# Patient Record
Sex: MALE | Race: WHITE | NOT HISPANIC OR LATINO | Employment: FULL TIME | URBAN - METROPOLITAN AREA
[De-identification: names, ages, dates, MRNs, and addresses within clinical notes are randomized per-mention and may not be internally consistent; named-entity substitution may affect disease eponyms.]

---

## 2017-01-16 RX ORDER — TRAZODONE HYDROCHLORIDE 100 MG/1
TABLET ORAL
Qty: 90 TABLET | Refills: 0 | Status: SHIPPED | OUTPATIENT
Start: 2017-01-16 | End: 2017-04-11 | Stop reason: SDUPTHER

## 2017-03-14 ENCOUNTER — OFFICE VISIT (OUTPATIENT)
Dept: INTERNAL MEDICINE | Facility: CLINIC | Age: 42
End: 2017-03-14

## 2017-03-14 VITALS
WEIGHT: 176 LBS | OXYGEN SATURATION: 98 % | HEART RATE: 89 BPM | SYSTOLIC BLOOD PRESSURE: 126 MMHG | BODY MASS INDEX: 24.64 KG/M2 | HEIGHT: 71 IN | DIASTOLIC BLOOD PRESSURE: 70 MMHG

## 2017-03-14 DIAGNOSIS — G43.911 INTRACTABLE MIGRAINE WITH STATUS MIGRAINOSUS, UNSPECIFIED MIGRAINE TYPE: ICD-10-CM

## 2017-03-14 DIAGNOSIS — C44.91 SKIN CANCER, BASAL CELL: ICD-10-CM

## 2017-03-14 DIAGNOSIS — J01.11 ACUTE RECURRENT FRONTAL SINUSITIS: Primary | ICD-10-CM

## 2017-03-14 PROCEDURE — 99214 OFFICE O/P EST MOD 30 MIN: CPT | Performed by: INTERNAL MEDICINE

## 2017-03-14 RX ORDER — TOPIRAMATE 25 MG/1
TABLET ORAL
Qty: 60 TABLET | Refills: 3 | Status: SHIPPED | OUTPATIENT
Start: 2017-03-14 | End: 2017-07-12 | Stop reason: DRUGHIGH

## 2017-03-14 RX ORDER — AMOXICILLIN AND CLAVULANATE POTASSIUM 875; 125 MG/1; MG/1
1 TABLET, FILM COATED ORAL 2 TIMES DAILY
Qty: 20 TABLET | Refills: 0 | Status: SHIPPED | OUTPATIENT
Start: 2017-03-14 | End: 2017-05-08

## 2017-03-14 NOTE — PROGRESS NOTES
Sinusitis; nasal drainage; requesting referral (dermatology; neurology); and Headache    Subjective   Carson Butler is a 41 y.o. male is here today for follow-up.    History of Present Illness   Had sinus infection x 2 mos., was told xyzal is OTC.  Also having a neck lesion,and would like to see derm.  Having chronic HA, wakes up with them, takes a lot of excedrin migraine. Daily HA.        Current Outpatient Prescriptions:   •  amoxicillin-clavulanate (AUGMENTIN) 875-125 MG per tablet, Take 1 tablet by mouth 2 (Two) Times a Day., Disp: 20 tablet, Rfl: 0  •  Chlorcyclizine-Pseudoephed 25-60 MG tablet, Take 1 tablet by mouth 2 (Two) Times a Day., Disp: 60 tablet, Rfl: 2  •  Cholecalciferol (VITAMIN D) 2000 UNITS capsule, Take  by mouth., Disp: , Rfl:   •  levocetirizine (XYZAL) 5 MG tablet, Take 5 mg by mouth every evening., Disp: , Rfl:   •  minocycline (MINOCIN,DYNACIN) 50 MG capsule, Take 1 capsule by mouth 2 (Two) Times a Day., Disp: 60 capsule, Rfl: 2  •  Multiple Vitamins-Minerals (MULTI FOR HIM PO), Take 1 capsule by mouth daily., Disp: , Rfl:   •  topiramate (TOPAMAX) 25 MG tablet, Take 1 PO QHS x 3 days then 2 PO QHS, Disp: 60 tablet, Rfl: 3  •  traZODone (DESYREL) 100 MG tablet, TAKE 1 TABLET BY MOUTH EVERY NIGHT, Disp: 90 tablet, Rfl: 0      The following portions of the patient's history were reviewed and updated as appropriate: allergies, current medications, past family history, past medical history, past social history, past surgical history and problem list.    Review of Systems   Constitutional: Positive for fatigue. Negative for chills and fever.   HENT: Positive for congestion, sinus pressure and voice change. Negative for ear discharge, ear pain and sore throat.    Respiratory: Negative for cough, chest tightness and shortness of breath.    Cardiovascular: Negative for chest pain, palpitations and leg swelling.   Gastrointestinal: Negative for diarrhea, nausea and vomiting.   Musculoskeletal:  "Negative for arthralgias, back pain and myalgias.   Skin: Positive for rash.   Neurological: Positive for dizziness and headaches. Negative for syncope.   Psychiatric/Behavioral: Negative for confusion and sleep disturbance.       Objective   Visit Vitals   • /70   • Pulse 89   • Ht 71\" (180.3 cm)   • Wt 176 lb (79.8 kg)   • SpO2 98%   • BMI 24.55 kg/m2     Physical Exam   Constitutional: He is oriented to person, place, and time. He appears well-developed and well-nourished.   HENT:   Head: Normocephalic and atraumatic.   Right Ear: Tympanic membrane is bulging.   Left Ear: Tympanic membrane is bulging.   Mouth/Throat: Posterior oropharyngeal erythema present. No oropharyngeal exudate or tonsillar abscesses.   Eyes: Pupils are equal, round, and reactive to light.   Neck: Normal range of motion.   Pulmonary/Chest: Effort normal and breath sounds normal. No stridor.   Abdominal: Soft. Bowel sounds are normal.   Lymphadenopathy:     He has cervical adenopathy.   Neurological: He is alert and oriented to person, place, and time.   Skin: Skin is warm and dry. Rash (2 cm pale pink lesion over left neck) noted.   Psychiatric: He has a normal mood and affect.         Results for orders placed or performed in visit on 05/06/16   POCT Urinalysis Dipstick, Automated   Result Value Ref Range    Color Yellow Yellow, Straw, Dark Yellow, Julia    Clarity, UA Clear Clear    Glucose, UA Negative Negative mg/dL    Bilirubin Negative Negative    Ketones, UA Negative Negative    Specific Gravity  1.005 1.005 - 1.030    Blood, UA Negative Negative    pH, Urine 7.0 5.0 - 8.0    Protein, POC Negative Negative mg/dL    Urobilinogen, UA Normal Normal    Leukocytes Negative Negative    Nitrite, UA Negative Negative             Assessment/Plan   Diagnoses and all orders for this visit:    Acute recurrent frontal sinusitis  -     Chlorcyclizine-Pseudoephed 25-60 MG tablet; Take 1 tablet by mouth 2 (Two) Times a Day.  -     " amoxicillin-clavulanate (AUGMENTIN) 875-125 MG per tablet; Take 1 tablet by mouth 2 (Two) Times a Day.    Intractable migraine with status migrainosus, unspecified migraine type  -     Ambulatory Referral to Neurology  -     topiramate (TOPAMAX) 25 MG tablet; Take 1 PO QHS x 3 days then 2 PO QHS    Skin cancer, basal cell  -     Ambulatory Referral to Dermatology               Return for Next scheduled follow up.

## 2017-03-16 RX ORDER — PSEUDOEPHEDRINE HCL 120 MG/1
120 TABLET, FILM COATED, EXTENDED RELEASE ORAL EVERY 12 HOURS
Qty: 60 TABLET | Refills: 1 | Status: SHIPPED | OUTPATIENT
Start: 2017-03-16 | End: 2017-08-21

## 2017-04-11 RX ORDER — TRAZODONE HYDROCHLORIDE 100 MG/1
TABLET ORAL
Qty: 90 TABLET | Refills: 0 | Status: SHIPPED | OUTPATIENT
Start: 2017-04-11 | End: 2017-07-05 | Stop reason: SDUPTHER

## 2017-04-25 RX ORDER — MINOCYCLINE HYDROCHLORIDE 50 MG/1
CAPSULE ORAL
Qty: 60 CAPSULE | Refills: 5 | Status: SHIPPED | OUTPATIENT
Start: 2017-04-25 | End: 2017-10-31 | Stop reason: SDUPTHER

## 2017-04-28 ENCOUNTER — OFFICE VISIT (OUTPATIENT)
Dept: NEUROLOGY | Facility: CLINIC | Age: 42
End: 2017-04-28

## 2017-04-28 VITALS
DIASTOLIC BLOOD PRESSURE: 80 MMHG | WEIGHT: 173 LBS | HEIGHT: 71 IN | SYSTOLIC BLOOD PRESSURE: 128 MMHG | BODY MASS INDEX: 24.22 KG/M2

## 2017-04-28 DIAGNOSIS — G43.719 INTRACTABLE CHRONIC MIGRAINE WITHOUT AURA AND WITHOUT STATUS MIGRAINOSUS: Primary | ICD-10-CM

## 2017-04-28 PROCEDURE — 99243 OFF/OP CNSLTJ NEW/EST LOW 30: CPT | Performed by: PSYCHIATRY & NEUROLOGY

## 2017-04-28 RX ORDER — TOBRAMYCIN 3 MG/ML
SOLUTION/ DROPS OPHTHALMIC
Refills: 0 | COMMUNITY
Start: 2017-01-26 | End: 2017-05-08

## 2017-04-28 NOTE — PROGRESS NOTES
"Subjective   Carson Butler is a 41 y.o. male.     History of Present Illness     The following portions of the patient's history were reviewed today and updated as appropriate:  allergies, current medications, past family history, past medical history, past social history, past surgical history and problem list.      Chief complaint is headache and the patient is seen today in consultation at the request of the referring health care provider  The time I spent with the patient today was used discussing the differential diagnosis, treatment and management options and prognosis. I addressed all of the patient's questions.  She has a long childhood history of migraine type headaches that are all over and occur about 4-5 times a week. However since she has started Topamax on 50 mg they are now once to twice a week. He has been overusing Excedrin but now is uses it about once a week. He also has some response even though not as strong with Tylenol    Review of Systems   Constitutional: Negative for appetite change, chills and fatigue.   HENT: Negative for congestion, ear pain, facial swelling and sinus pressure.    Eyes: Negative for pain and redness.   Respiratory: Negative for shortness of breath.    Cardiovascular: Negative for chest pain.   Gastrointestinal: Negative for abdominal pain.   Endocrine: Negative for cold intolerance and heat intolerance.   Genitourinary: Negative for dysuria.   Musculoskeletal: Negative for arthralgias.   Skin: Negative for rash.   Allergic/Immunologic: Negative for immunocompromised state.   Neurological: Positive for headaches.   Hematological: Negative for adenopathy.   Psychiatric/Behavioral: Negative for hallucinations.         Objective      height is 71\" (180.3 cm) and weight is 173 lb (78.5 kg). His blood pressure is 128/80.     The patient's general appearance was normal today  Carotid pulses were palpable bilaterally  The ophthalmological exam showed the refractory media " clear, no blurring of disc margins, there were no hemorrhages noted, blood vessels appeared normal.      Neurologic Exam     Mental Status   Oriented to person.   Oriented to place. Oriented to country, city, area and street.   Oriented to time. Oriented to year, month, date, day and season.   Registration: recalls 3 of 3 objects. Recall at 5 minutes: recalls 3 of 3 objects. Follows 3 step commands.   Attention: normal.   Speech: speech is normal   Level of consciousness: alert  Knowledge: consistent with education.   Able to name object. Able to read. Able to repeat. Able to write. Normal comprehension.     Cranial Nerves     CN II   Visual fields full to confrontation.     CN III, IV, VI   Right pupil: Shape: regular. Consensual response: intact. Accommodation: intact.   Left pupil: Shape: regular. Consensual response: intact.   CN III: no CN III palsy  CN VI: no CN VI palsy  Nystagmus: none   Diplopia: none  Ophthalmoparesis: none  Upgaze: normal  Downgaze: normal  Conjugate gaze: present  Vestibulo-ocular reflex: present    CN V   Facial sensation intact.     CN VII   Facial expression full, symmetric.     CN VIII   CN VIII normal.     CN IX, X   CN IX normal.     CN XI   CN XI normal.     CN XII   CN XII normal.     Motor Exam   Muscle bulk: normal  Overall muscle tone: normal  Right arm pronator drift: absent  Left arm pronator drift: absent    Strength   Strength 5/5 except as noted.     Sensory Exam   Light touch normal.     Gait, Coordination, and Reflexes     Gait  Gait: normal    Coordination   Romberg: negative  Finger to nose coordination: normal  Heel to shin coordination: normal  Tandem walking coordination: normal    Tremor   Resting tremor: absent  Intention tremor: absent  Action tremor: absent    Reflexes   Reflexes 2+ except as noted.       Assessment/Plan     Carson was seen today for migraine and headache.    Diagnoses and all orders for this visit:    Intractable chronic migraine without aura  "and without status migrainosus        Discussion/Summary:    I explained the concept of analgesic overuse headaches to the patient\" chronic daily headaches triggering more headaches. At this point he is to increase the dose of Topamax to 75 mg. He had passing Coordination problems and issues with memory but hopefully they will not recur. If he keeps having a headache during the week he is to further increase the dose to 100 mg up to a maximum dose of 200 mg for now. He is to use over-the-counter medications for headaches not more frequently than once a week and if he has any problems is to contact the office that he can be reevaluated I will see him back as needed.                Disclaimer: This letter was created using dragon voice recognition software.  This voice recognition software may create errors that may go undetected and can impact the meaning of a sentence or paragraph.  The most frequent error is that the software misidentifies \"he\" and \"she\". However, contextual reading is often able to identify this as a voice recognotion error.  Another frequent error is that the software misidentifies \"the patient\" as \"\"          "

## 2017-05-02 ENCOUNTER — LAB (OUTPATIENT)
Dept: INTERNAL MEDICINE | Facility: CLINIC | Age: 42
End: 2017-05-02

## 2017-05-02 DIAGNOSIS — E78.5 DYSLIPIDEMIA: ICD-10-CM

## 2017-05-02 DIAGNOSIS — Z00.00 ANNUAL PHYSICAL EXAM: ICD-10-CM

## 2017-05-02 DIAGNOSIS — E55.9 VITAMIN D DEFICIENCY: Primary | ICD-10-CM

## 2017-05-02 LAB
25(OH)D3 SERPL-MCNC: 46.4 NG/ML
ALBUMIN SERPL-MCNC: 4.2 G/DL (ref 3.2–4.8)
ALBUMIN/GLOB SERPL: 1.4 G/DL (ref 1.5–2.5)
ALP SERPL-CCNC: 52 U/L (ref 25–100)
ALT SERPL W P-5'-P-CCNC: 31 U/L (ref 7–40)
ANION GAP SERPL CALCULATED.3IONS-SCNC: 7 MMOL/L (ref 3–11)
ARTICHOKE IGE QN: 156 MG/DL (ref 0–130)
AST SERPL-CCNC: 21 U/L (ref 0–33)
BASOPHILS # BLD AUTO: 0.07 10*3/MM3 (ref 0–0.2)
BASOPHILS NFR BLD AUTO: 1 % (ref 0–1)
BILIRUB SERPL-MCNC: 0.5 MG/DL (ref 0.3–1.2)
BUN BLD-MCNC: 15 MG/DL (ref 9–23)
BUN/CREAT SERPL: 16.7 (ref 7–25)
CALCIUM SPEC-SCNC: 10.2 MG/DL (ref 8.7–10.4)
CHLORIDE SERPL-SCNC: 108 MMOL/L (ref 99–109)
CHOLEST SERPL-MCNC: 219 MG/DL (ref 0–200)
CO2 SERPL-SCNC: 29 MMOL/L (ref 20–31)
CREAT BLD-MCNC: 0.9 MG/DL (ref 0.6–1.3)
DEPRECATED RDW RBC AUTO: 46 FL (ref 37–54)
EOSINOPHIL # BLD AUTO: 0.35 10*3/MM3 (ref 0.1–0.3)
EOSINOPHIL NFR BLD AUTO: 4.8 % (ref 0–3)
ERYTHROCYTE [DISTWIDTH] IN BLOOD BY AUTOMATED COUNT: 14.1 % (ref 11.3–14.5)
GFR SERPL CREATININE-BSD FRML MDRD: 92 ML/MIN/1.73
GLOBULIN UR ELPH-MCNC: 2.9 GM/DL
GLUCOSE BLD-MCNC: 108 MG/DL (ref 70–100)
HCT VFR BLD AUTO: 42.1 % (ref 38.9–50.9)
HDLC SERPL-MCNC: 45 MG/DL (ref 40–60)
HGB BLD-MCNC: 13.7 G/DL (ref 13.1–17.5)
IMM GRANULOCYTES # BLD: 0.02 10*3/MM3 (ref 0–0.03)
IMM GRANULOCYTES NFR BLD: 0.3 % (ref 0–0.6)
LYMPHOCYTES # BLD AUTO: 1.86 10*3/MM3 (ref 0.6–4.8)
LYMPHOCYTES NFR BLD AUTO: 25.5 % (ref 24–44)
MCH RBC QN AUTO: 29.4 PG (ref 27–31)
MCHC RBC AUTO-ENTMCNC: 32.5 G/DL (ref 32–36)
MCV RBC AUTO: 90.3 FL (ref 80–99)
MONOCYTES # BLD AUTO: 0.72 10*3/MM3 (ref 0–1)
MONOCYTES NFR BLD AUTO: 9.9 % (ref 0–12)
NEUTROPHILS # BLD AUTO: 4.26 10*3/MM3 (ref 1.5–8.3)
NEUTROPHILS NFR BLD AUTO: 58.5 % (ref 41–71)
PLATELET # BLD AUTO: 424 10*3/MM3 (ref 150–450)
PMV BLD AUTO: 9.6 FL (ref 6–12)
POTASSIUM BLD-SCNC: 4.3 MMOL/L (ref 3.5–5.5)
PROT SERPL-MCNC: 7.1 G/DL (ref 5.7–8.2)
RBC # BLD AUTO: 4.66 10*6/MM3 (ref 4.2–5.76)
SODIUM BLD-SCNC: 144 MMOL/L (ref 132–146)
TRIGL SERPL-MCNC: 146 MG/DL (ref 0–150)
TSH SERPL DL<=0.05 MIU/L-ACNC: 0.95 MIU/ML (ref 0.35–5.35)
WBC NRBC COR # BLD: 7.28 10*3/MM3 (ref 3.5–10.8)

## 2017-05-02 PROCEDURE — 80061 LIPID PANEL: CPT | Performed by: INTERNAL MEDICINE

## 2017-05-02 PROCEDURE — 84443 ASSAY THYROID STIM HORMONE: CPT | Performed by: INTERNAL MEDICINE

## 2017-05-02 PROCEDURE — 80053 COMPREHEN METABOLIC PANEL: CPT | Performed by: INTERNAL MEDICINE

## 2017-05-02 PROCEDURE — 85025 COMPLETE CBC W/AUTO DIFF WBC: CPT | Performed by: INTERNAL MEDICINE

## 2017-05-02 PROCEDURE — 82306 VITAMIN D 25 HYDROXY: CPT | Performed by: INTERNAL MEDICINE

## 2017-05-05 ENCOUNTER — LAB (OUTPATIENT)
Dept: INTERNAL MEDICINE | Facility: CLINIC | Age: 42
End: 2017-05-05

## 2017-05-05 DIAGNOSIS — Z00.00 HEALTH CARE MAINTENANCE: Primary | ICD-10-CM

## 2017-05-08 ENCOUNTER — OFFICE VISIT (OUTPATIENT)
Dept: INTERNAL MEDICINE | Facility: CLINIC | Age: 42
End: 2017-05-08

## 2017-05-08 VITALS
WEIGHT: 173.8 LBS | HEIGHT: 71 IN | HEART RATE: 97 BPM | SYSTOLIC BLOOD PRESSURE: 128 MMHG | OXYGEN SATURATION: 99 % | BODY MASS INDEX: 24.33 KG/M2 | DIASTOLIC BLOOD PRESSURE: 84 MMHG

## 2017-05-08 DIAGNOSIS — Z90.81 H/O SPLENECTOMY: ICD-10-CM

## 2017-05-08 DIAGNOSIS — E78.5 DYSLIPIDEMIA: ICD-10-CM

## 2017-05-08 DIAGNOSIS — J30.9 ALLERGIC RHINITIS, UNSPECIFIED ALLERGIC RHINITIS TRIGGER, UNSPECIFIED RHINITIS SEASONALITY: ICD-10-CM

## 2017-05-08 DIAGNOSIS — Z00.00 ANNUAL PHYSICAL EXAM: Primary | ICD-10-CM

## 2017-05-08 PROCEDURE — 90670 PCV13 VACCINE IM: CPT | Performed by: INTERNAL MEDICINE

## 2017-05-08 PROCEDURE — 90471 IMMUNIZATION ADMIN: CPT | Performed by: INTERNAL MEDICINE

## 2017-05-08 PROCEDURE — 99396 PREV VISIT EST AGE 40-64: CPT | Performed by: INTERNAL MEDICINE

## 2017-06-13 RX ORDER — TOPIRAMATE 100 MG/1
100 TABLET, FILM COATED ORAL 2 TIMES DAILY
Qty: 60 TABLET | Refills: 5 | Status: SHIPPED | OUTPATIENT
Start: 2017-06-13 | End: 2018-05-08 | Stop reason: SDUPTHER

## 2017-06-14 ENCOUNTER — TELEPHONE (OUTPATIENT)
Dept: NEUROLOGY | Facility: CLINIC | Age: 42
End: 2017-06-14

## 2017-06-14 NOTE — TELEPHONE ENCOUNTER
----- Message from Cristino Márquez MD sent at 6/14/2017  8:54 AM EDT -----  Contact: MACHO  Thanks!  ----- Message -----     From: Camilo Larson MA     Sent: 6/13/2017   4:47 PM       To: Cristino Márquez MD    I sent in new rx  ----- Message -----     From: Pat Santa     Sent: 6/13/2017   1:03 PM       To: [facility] [place]    JACK    PRESTON CALLED AND LEFT A MESSAGE. HE SAID JACK WANTED HIM TO CALL THE OFFICE ONCE HIS TOPIRAMATE WAS LEVELED OUT AND TO TELL HIM WHAT HE NEEDS NOW.    HE SAID HE NEEDS A 100 MG PRESCRIPTION  60 - WITH REFILLS - TO THE PHARMACY ON FILE    PLEASE CALL HIM BACK  [phone]

## 2017-07-05 RX ORDER — TRAZODONE HYDROCHLORIDE 100 MG/1
TABLET ORAL
Qty: 90 TABLET | Refills: 1 | Status: SHIPPED | OUTPATIENT
Start: 2017-07-05 | End: 2018-02-21 | Stop reason: SDUPTHER

## 2017-07-12 ENCOUNTER — OFFICE VISIT (OUTPATIENT)
Dept: INTERNAL MEDICINE | Facility: CLINIC | Age: 42
End: 2017-07-12

## 2017-07-12 VITALS
TEMPERATURE: 98.8 F | BODY MASS INDEX: 23.15 KG/M2 | DIASTOLIC BLOOD PRESSURE: 78 MMHG | OXYGEN SATURATION: 99 % | SYSTOLIC BLOOD PRESSURE: 120 MMHG | HEART RATE: 89 BPM | WEIGHT: 166 LBS

## 2017-07-12 DIAGNOSIS — J06.9 VIRAL UPPER RESPIRATORY TRACT INFECTION: Primary | ICD-10-CM

## 2017-07-12 PROCEDURE — 99213 OFFICE O/P EST LOW 20 MIN: CPT | Performed by: NURSE PRACTITIONER

## 2017-07-12 RX ORDER — ALBUTEROL SULFATE 90 UG/1
2 AEROSOL, METERED RESPIRATORY (INHALATION) EVERY 4 HOURS PRN
Qty: 18 G | Refills: 1 | Status: SHIPPED | OUTPATIENT
Start: 2017-07-12 | End: 2017-11-08

## 2017-07-12 RX ORDER — PSEUDOEPHEDRINE HYDROCHLORIDE 120 MG/1
TABLET, FILM COATED, EXTENDED RELEASE ORAL
Refills: 1 | COMMUNITY
Start: 2017-06-30 | End: 2017-08-21 | Stop reason: SDUPTHER

## 2017-07-12 NOTE — PROGRESS NOTES
Subjective  Cough (chest burns when coughing, sometimes productive, ongoing since Monday ) and Fever      Carson Butler is a 41 y.o. male.   Allergies   Allergen Reactions   • Other      History of Present Illness      Sx x 3 days , dry cough that took his breath and did have yellow brown sputum at that time, thinks he had a temp yesterday, is taking sudafed and otc sinus meds w/o relief , has had sore throat x 2 days   The following portions of the patient's history were reviewed and updated as appropriate: allergies, current medications, past family history, past medical history, past social history, past surgical history and problem list.    Review of Systems   Constitutional: Positive for fatigue and fever. Negative for activity change and unexpected weight change.   HENT: Positive for congestion, postnasal drip and sore throat. Negative for ear pain.    Eyes: Negative for pain and discharge.   Respiratory: Positive for cough. Negative for chest tightness, shortness of breath and wheezing.    Cardiovascular: Negative for chest pain and palpitations.   Gastrointestinal: Negative for abdominal pain, diarrhea and vomiting.   Endocrine: Negative.    Genitourinary: Negative.    Musculoskeletal: Negative for joint swelling.   Skin: Negative for color change, rash and wound.   Allergic/Immunologic: Negative.    Neurological: Negative for seizures and syncope.   Psychiatric/Behavioral: Negative.        Objective   Physical Exam   Constitutional: He is oriented to person, place, and time. He appears well-developed and well-nourished.  Non-toxic appearance. No distress.   HENT:   Head: Normocephalic and atraumatic. Hair is normal.   Right Ear: No drainage, swelling or tenderness. Tympanic membrane is retracted.   Left Ear: No drainage, swelling or tenderness. Tympanic membrane is retracted.   Nose: Mucosal edema present. No epistaxis.   Mouth/Throat: Uvula is midline and mucous membranes are normal. No oral lesions. No  uvula swelling. Posterior oropharyngeal erythema present. No oropharyngeal exudate.   lg pnd   Eyes: Conjunctivae and EOM are normal. Pupils are equal, round, and reactive to light. Right eye exhibits no discharge. Left eye exhibits no discharge. No scleral icterus.   Neck: Normal range of motion. Neck supple.   Cardiovascular: Normal rate and regular rhythm.  Exam reveals no gallop.    No murmur heard.  Pulmonary/Chest: Breath sounds normal. No stridor. No respiratory distress. He has no wheezes. He has no rales. He exhibits no tenderness.   Abdominal: Soft. There is no tenderness.   Lymphadenopathy:     He has cervical adenopathy.   Neurological: He is alert and oriented to person, place, and time. He exhibits normal muscle tone.   Skin: Skin is warm and dry. No rash noted. He is not diaphoretic.   Psychiatric: He has a normal mood and affect. His behavior is normal. Judgment and thought content normal.   Nursing note and vitals reviewed.    /78  Pulse 89  Temp 98.8 °F (37.1 °C)  Wt 166 lb (75.3 kg)  SpO2 99%  BMI 23.15 kg/m2    Assessment/Plan     Problem List Items Addressed This Visit     None      Visit Diagnoses     Viral upper respiratory tract infection    -  Primary    Relevant Medications    albuterol (PROVENTIL HFA;VENTOLIN HFA) 108 (90 BASE) MCG/ACT inhaler        Mucinex DM otc  Increase fluids. Tylenol/ibuprofen prn comfort, rtc 48h no improvement or worsening of sx.

## 2017-08-21 RX ORDER — PSEUDOEPHEDRINE HCL 120 MG/1
TABLET, FILM COATED, EXTENDED RELEASE ORAL
Qty: 60 TABLET | Refills: 3 | Status: SHIPPED | OUTPATIENT
Start: 2017-08-21 | End: 2017-11-01

## 2017-10-31 RX ORDER — MINOCYCLINE HYDROCHLORIDE 50 MG/1
CAPSULE ORAL
Qty: 60 CAPSULE | Refills: 0 | Status: SHIPPED | OUTPATIENT
Start: 2017-10-31 | End: 2018-04-17 | Stop reason: SDUPTHER

## 2017-11-01 ENCOUNTER — TELEPHONE (OUTPATIENT)
Dept: INTERNAL MEDICINE | Facility: CLINIC | Age: 42
End: 2017-11-01

## 2017-11-01 ENCOUNTER — LAB (OUTPATIENT)
Dept: INTERNAL MEDICINE | Facility: CLINIC | Age: 42
End: 2017-11-01

## 2017-11-01 DIAGNOSIS — E78.5 DYSLIPIDEMIA: ICD-10-CM

## 2017-11-01 DIAGNOSIS — Z00.00 ANNUAL PHYSICAL EXAM: ICD-10-CM

## 2017-11-01 LAB
ALBUMIN SERPL-MCNC: 4.1 G/DL (ref 3.2–4.8)
ALBUMIN/GLOB SERPL: 1.6 G/DL (ref 1.5–2.5)
ALP SERPL-CCNC: 50 U/L (ref 25–100)
ALT SERPL W P-5'-P-CCNC: 25 U/L (ref 7–40)
ANION GAP SERPL CALCULATED.3IONS-SCNC: 0 MMOL/L (ref 3–11)
ARTICHOKE IGE QN: 114 MG/DL (ref 0–130)
AST SERPL-CCNC: 20 U/L (ref 0–33)
BILIRUB SERPL-MCNC: 1.1 MG/DL (ref 0.3–1.2)
BUN BLD-MCNC: 20 MG/DL (ref 9–23)
BUN/CREAT SERPL: 22.2 (ref 7–25)
CALCIUM SPEC-SCNC: 9.1 MG/DL (ref 8.7–10.4)
CHLORIDE SERPL-SCNC: 106 MMOL/L (ref 99–109)
CHOLEST SERPL-MCNC: 160 MG/DL (ref 0–200)
CO2 SERPL-SCNC: 33 MMOL/L (ref 20–31)
CREAT BLD-MCNC: 0.9 MG/DL (ref 0.6–1.3)
GFR SERPL CREATININE-BSD FRML MDRD: 92 ML/MIN/1.73
GLOBULIN UR ELPH-MCNC: 2.5 GM/DL
GLUCOSE BLD-MCNC: 89 MG/DL (ref 70–100)
HDLC SERPL-MCNC: 44 MG/DL (ref 40–60)
POTASSIUM BLD-SCNC: 4.1 MMOL/L (ref 3.5–5.5)
PROT SERPL-MCNC: 6.6 G/DL (ref 5.7–8.2)
PSA SERPL-MCNC: 0.47 NG/ML (ref 0–4)
SODIUM BLD-SCNC: 139 MMOL/L (ref 132–146)
TRIGL SERPL-MCNC: 71 MG/DL (ref 0–150)

## 2017-11-01 PROCEDURE — 80053 COMPREHEN METABOLIC PANEL: CPT | Performed by: INTERNAL MEDICINE

## 2017-11-01 PROCEDURE — 84153 ASSAY OF PSA TOTAL: CPT | Performed by: INTERNAL MEDICINE

## 2017-11-01 PROCEDURE — 80061 LIPID PANEL: CPT | Performed by: INTERNAL MEDICINE

## 2017-11-01 NOTE — TELEPHONE ENCOUNTER
Pt requested that his pseudoephedrine be switched to 24 hour one tablet a day, instead of 12 hour twice a day. Please advise.

## 2017-11-08 ENCOUNTER — OFFICE VISIT (OUTPATIENT)
Dept: INTERNAL MEDICINE | Facility: CLINIC | Age: 42
End: 2017-11-08

## 2017-11-08 ENCOUNTER — TELEPHONE (OUTPATIENT)
Dept: INTERNAL MEDICINE | Facility: CLINIC | Age: 42
End: 2017-11-08

## 2017-11-08 VITALS
HEIGHT: 71 IN | HEART RATE: 88 BPM | BODY MASS INDEX: 22.79 KG/M2 | OXYGEN SATURATION: 99 % | WEIGHT: 162.8 LBS | SYSTOLIC BLOOD PRESSURE: 110 MMHG | DIASTOLIC BLOOD PRESSURE: 70 MMHG

## 2017-11-08 DIAGNOSIS — E04.1 THYROID NODULE: Primary | ICD-10-CM

## 2017-11-08 DIAGNOSIS — E78.5 DYSLIPIDEMIA: ICD-10-CM

## 2017-11-08 DIAGNOSIS — Z00.00 ANNUAL PHYSICAL EXAM: Primary | ICD-10-CM

## 2017-11-08 DIAGNOSIS — G43.011 INTRACTABLE MIGRAINE WITHOUT AURA AND WITH STATUS MIGRAINOSUS: ICD-10-CM

## 2017-11-08 PROCEDURE — 99214 OFFICE O/P EST MOD 30 MIN: CPT | Performed by: INTERNAL MEDICINE

## 2017-11-08 NOTE — PROGRESS NOTES
Follow-up (Thyroid Nodule); Dyslipidema; and Vitamin D Deficiency    Subjective   Carson Butler is a 41 y.o. male is here today for follow-up.    History of Present Illness   Having issues with sleep, getting 3 - 5 hrs due to working night shifts.  Has lost > 10 lbs, thinks due to above and from being on the topamax.  Headaches are coming back, and Dr. Dunham has moved, hence would like recommendations and referral for a neurologist.  Has been working oin his cholesterol, and had labs this visit.      Current Outpatient Prescriptions:   •  Cholecalciferol (VITAMIN D) 2000 UNITS capsule, Take  by mouth., Disp: , Rfl:   •  levocetirizine (XYZAL) 5 MG tablet, Take 5 mg by mouth every evening., Disp: , Rfl:   •  minocycline (MINOCIN,DYNACIN) 50 MG capsule, TAKE 1 CAPSULE BY MOUTH TWICE DAILY, Disp: 60 capsule, Rfl: 0  •  Multiple Vitamins-Minerals (MULTI FOR HIM PO), Take 1 capsule by mouth daily., Disp: , Rfl:   •  Pseudoephedrine HCl ER (SUDAFED 24 HOUR NON-DROWSY) 240 MG tablet sustained-release 24 hour, Take 1 tablet by mouth Daily., Disp: 30 each, Rfl: 5  •  topiramate (TOPAMAX) 100 MG tablet, Take 1 tablet by mouth 2 (Two) Times a Day., Disp: 60 tablet, Rfl: 5  •  traZODone (DESYREL) 100 MG tablet, TAKE 1 TABLET BY MOUTH EVERY NIGHT, Disp: 90 tablet, Rfl: 1      The following portions of the patient's history were reviewed and updated as appropriate: allergies, current medications, past family history, past medical history, past social history, past surgical history and problem list.    Review of Systems   Constitutional: Negative.  Negative for chills and fever.   HENT: Negative for ear discharge, ear pain, sinus pressure and sore throat.    Respiratory: Negative for cough, chest tightness and shortness of breath.    Cardiovascular: Negative for chest pain, palpitations and leg swelling.   Gastrointestinal: Negative for diarrhea, nausea and vomiting.   Musculoskeletal: Negative for arthralgias, back pain and  "myalgias.   Neurological: Negative for dizziness, syncope and headaches.   Psychiatric/Behavioral: Negative for confusion and sleep disturbance.       Objective   /70  Pulse 88  Ht 71\" (180.3 cm)  Wt 162 lb 12.8 oz (73.8 kg)  SpO2 99%  BMI 22.71 kg/m2  Physical Exam   Constitutional: He is oriented to person, place, and time. He appears well-developed and well-nourished.   HENT:   Head: Normocephalic and atraumatic.   Right Ear: External ear normal.   Left Ear: External ear normal.   Mouth/Throat: No oropharyngeal exudate.   Eyes: Conjunctivae are normal. Pupils are equal, round, and reactive to light.   Neck: Neck supple. No thyromegaly present.   Cardiovascular: Normal rate, regular rhythm and intact distal pulses.    Pulmonary/Chest: Effort normal and breath sounds normal.   Abdominal: Soft. Bowel sounds are normal. He exhibits no distension. There is no tenderness.   Musculoskeletal: He exhibits no edema.   Neurological: He is alert and oriented to person, place, and time. No cranial nerve deficit.   Skin: Skin is warm and dry.   Psychiatric: He has a normal mood and affect. Judgment normal.   Nursing note and vitals reviewed.        Results for orders placed or performed in visit on 11/01/17   Comprehensive Metabolic Panel   Result Value Ref Range    Glucose 89 70 - 100 mg/dL    BUN 20 9 - 23 mg/dL    Creatinine 0.90 0.60 - 1.30 mg/dL    Sodium 139 132 - 146 mmol/L    Potassium 4.1 3.5 - 5.5 mmol/L    Chloride 106 99 - 109 mmol/L    CO2 33.0 (H) 20.0 - 31.0 mmol/L    Calcium 9.1 8.7 - 10.4 mg/dL    Total Protein 6.6 5.7 - 8.2 g/dL    Albumin 4.10 3.20 - 4.80 g/dL    ALT (SGPT) 25 7 - 40 U/L    AST (SGOT) 20 0 - 33 U/L    Alkaline Phosphatase 50 25 - 100 U/L    Total Bilirubin 1.1 0.3 - 1.2 mg/dL    eGFR Non African Amer 92 >60 mL/min/1.73    Globulin 2.5 gm/dL    A/G Ratio 1.6 1.5 - 2.5 g/dL    BUN/Creatinine Ratio 22.2 7.0 - 25.0    Anion Gap 0.0 (L) 3.0 - 11.0 mmol/L   Lipid Panel   Result Value " Ref Range    Total Cholesterol 160 0 - 200 mg/dL    Triglycerides 71 0 - 150 mg/dL    HDL Cholesterol 44 40 - 60 mg/dL    LDL Cholesterol  114 0 - 130 mg/dL   PSA   Result Value Ref Range    PSA 0.470 0.000 - 4.000 ng/mL             Assessment/Plan   Diagnoses and all orders for this visit:    Thyroid nodule    Dyslipidemia  Comments:  much better with weight loss.    Intractable migraine without aura and with status migrainosus  Comments:  takes excedrin migraine prn.  Orders:  -     Ambulatory Referral to Neurology             Return in about 6 months (around 5/8/2018) for Annual physical.

## 2017-12-30 RX ORDER — TRAZODONE HYDROCHLORIDE 100 MG/1
TABLET ORAL
Qty: 90 TABLET | Refills: 0 | OUTPATIENT
Start: 2017-12-30

## 2018-02-14 ENCOUNTER — TELEPHONE (OUTPATIENT)
Dept: INTERNAL MEDICINE | Facility: CLINIC | Age: 43
End: 2018-02-14

## 2018-02-21 RX ORDER — TRAZODONE HYDROCHLORIDE 100 MG/1
TABLET ORAL
Qty: 90 TABLET | Refills: 0 | Status: SHIPPED | OUTPATIENT
Start: 2018-02-21 | End: 2018-05-08 | Stop reason: SDUPTHER

## 2018-02-28 ENCOUNTER — TELEPHONE (OUTPATIENT)
Dept: INTERNAL MEDICINE | Facility: CLINIC | Age: 43
End: 2018-02-28

## 2018-04-17 RX ORDER — MINOCYCLINE HYDROCHLORIDE 50 MG/1
CAPSULE ORAL
Qty: 60 CAPSULE | Refills: 0 | Status: SHIPPED | OUTPATIENT
Start: 2018-04-17 | End: 2018-05-08 | Stop reason: SDUPTHER

## 2018-05-01 ENCOUNTER — LAB (OUTPATIENT)
Dept: INTERNAL MEDICINE | Facility: CLINIC | Age: 43
End: 2018-05-01

## 2018-05-01 DIAGNOSIS — Z00.00 ANNUAL PHYSICAL EXAM: ICD-10-CM

## 2018-05-01 LAB
25(OH)D3 SERPL-MCNC: 44.9 NG/ML
ALBUMIN SERPL-MCNC: 4.1 G/DL (ref 3.2–4.8)
ALBUMIN/GLOB SERPL: 1.6 G/DL (ref 1.5–2.5)
ALP SERPL-CCNC: 52 U/L (ref 25–100)
ALT SERPL W P-5'-P-CCNC: 25 U/L (ref 7–40)
ANION GAP SERPL CALCULATED.3IONS-SCNC: 4 MMOL/L (ref 3–11)
ARTICHOKE IGE QN: 124 MG/DL (ref 0–130)
AST SERPL-CCNC: 19 U/L (ref 0–33)
BILIRUB SERPL-MCNC: 0.7 MG/DL (ref 0.3–1.2)
BUN BLD-MCNC: 22 MG/DL (ref 9–23)
BUN/CREAT SERPL: 22 (ref 7–25)
CALCIUM SPEC-SCNC: 9.3 MG/DL (ref 8.7–10.4)
CHLORIDE SERPL-SCNC: 108 MMOL/L (ref 99–109)
CHOLEST SERPL-MCNC: 183 MG/DL (ref 0–200)
CO2 SERPL-SCNC: 28 MMOL/L (ref 20–31)
CREAT BLD-MCNC: 1 MG/DL (ref 0.6–1.3)
DEPRECATED RDW RBC AUTO: 45.5 FL (ref 37–54)
ERYTHROCYTE [DISTWIDTH] IN BLOOD BY AUTOMATED COUNT: 13.9 % (ref 11.3–14.5)
GFR SERPL CREATININE-BSD FRML MDRD: 81 ML/MIN/1.73
GLOBULIN UR ELPH-MCNC: 2.5 GM/DL
GLUCOSE BLD-MCNC: 109 MG/DL (ref 70–100)
HBA1C MFR BLD: 6 % (ref 4.8–5.6)
HCT VFR BLD AUTO: 43.1 % (ref 38.9–50.9)
HDLC SERPL-MCNC: 43 MG/DL (ref 40–60)
HGB BLD-MCNC: 14.3 G/DL (ref 13.1–17.5)
MCH RBC QN AUTO: 29.5 PG (ref 27–31)
MCHC RBC AUTO-ENTMCNC: 33.2 G/DL (ref 32–36)
MCV RBC AUTO: 88.9 FL (ref 80–99)
PLATELET # BLD AUTO: 357 10*3/MM3 (ref 150–450)
PMV BLD AUTO: 9.8 FL (ref 6–12)
POTASSIUM BLD-SCNC: 4.4 MMOL/L (ref 3.5–5.5)
PROT SERPL-MCNC: 6.6 G/DL (ref 5.7–8.2)
RBC # BLD AUTO: 4.85 10*6/MM3 (ref 4.2–5.76)
SODIUM BLD-SCNC: 140 MMOL/L (ref 132–146)
TRIGL SERPL-MCNC: 145 MG/DL (ref 0–150)
TSH SERPL DL<=0.05 MIU/L-ACNC: 0.81 MIU/ML (ref 0.35–5.35)
WBC NRBC COR # BLD: 7.17 10*3/MM3 (ref 3.5–10.8)

## 2018-05-01 PROCEDURE — 82306 VITAMIN D 25 HYDROXY: CPT | Performed by: INTERNAL MEDICINE

## 2018-05-01 PROCEDURE — 80061 LIPID PANEL: CPT | Performed by: INTERNAL MEDICINE

## 2018-05-01 PROCEDURE — 83036 HEMOGLOBIN GLYCOSYLATED A1C: CPT | Performed by: INTERNAL MEDICINE

## 2018-05-01 PROCEDURE — 85027 COMPLETE CBC AUTOMATED: CPT | Performed by: INTERNAL MEDICINE

## 2018-05-01 PROCEDURE — 84443 ASSAY THYROID STIM HORMONE: CPT | Performed by: INTERNAL MEDICINE

## 2018-05-01 PROCEDURE — 80053 COMPREHEN METABOLIC PANEL: CPT | Performed by: INTERNAL MEDICINE

## 2018-05-08 ENCOUNTER — OFFICE VISIT (OUTPATIENT)
Dept: INTERNAL MEDICINE | Facility: CLINIC | Age: 43
End: 2018-05-08

## 2018-05-08 VITALS
OXYGEN SATURATION: 98 % | HEIGHT: 71 IN | DIASTOLIC BLOOD PRESSURE: 84 MMHG | WEIGHT: 166 LBS | BODY MASS INDEX: 23.24 KG/M2 | HEART RATE: 108 BPM | SYSTOLIC BLOOD PRESSURE: 130 MMHG | TEMPERATURE: 98.6 F

## 2018-05-08 DIAGNOSIS — J30.89 CHRONIC NONSEASONAL ALLERGIC RHINITIS DUE TO OTHER ALLERGEN: ICD-10-CM

## 2018-05-08 DIAGNOSIS — E04.1 THYROID NODULE: ICD-10-CM

## 2018-05-08 DIAGNOSIS — R73.01 IFG (IMPAIRED FASTING GLUCOSE): ICD-10-CM

## 2018-05-08 DIAGNOSIS — E78.5 DYSLIPIDEMIA: ICD-10-CM

## 2018-05-08 DIAGNOSIS — Z00.00 ANNUAL PHYSICAL EXAM: Primary | ICD-10-CM

## 2018-05-08 DIAGNOSIS — E55.9 VITAMIN D DEFICIENCY: ICD-10-CM

## 2018-05-08 DIAGNOSIS — G43.019 INTRACTABLE MIGRAINE WITHOUT AURA AND WITHOUT STATUS MIGRAINOSUS: ICD-10-CM

## 2018-05-08 DIAGNOSIS — Z90.81 H/O SPLENECTOMY: ICD-10-CM

## 2018-05-08 LAB
BILIRUB BLD-MCNC: NEGATIVE MG/DL
CLARITY, POC: CLEAR
COLOR UR: YELLOW
GLUCOSE UR STRIP-MCNC: NEGATIVE MG/DL
INDURATION: NORMAL MM (ref 0–10)
KETONES UR QL: NEGATIVE
LEUKOCYTE EST, POC: NEGATIVE
NITRITE UR-MCNC: NEGATIVE MG/ML
PH UR: 6.5 [PH] (ref 5–8)
PROT UR STRIP-MCNC: NEGATIVE MG/DL
RBC # UR STRIP: NEGATIVE /UL
SP GR UR: 1.01 (ref 1–1.03)
TB SKIN TEST: NORMAL
UROBILINOGEN UR QL: NORMAL

## 2018-05-08 PROCEDURE — 99396 PREV VISIT EST AGE 40-64: CPT | Performed by: INTERNAL MEDICINE

## 2018-05-08 PROCEDURE — 81003 URINALYSIS AUTO W/O SCOPE: CPT | Performed by: INTERNAL MEDICINE

## 2018-05-08 RX ORDER — MINOCYCLINE HYDROCHLORIDE 50 MG/1
50 CAPSULE ORAL DAILY
Qty: 90 CAPSULE | Refills: 1 | Status: SHIPPED | OUTPATIENT
Start: 2018-05-08 | End: 2019-01-08 | Stop reason: SDUPTHER

## 2018-05-08 RX ORDER — MINOCYCLINE HYDROCHLORIDE 50 MG/1
50 CAPSULE ORAL 2 TIMES DAILY
Qty: 60 CAPSULE | Refills: 5 | Status: CANCELLED | OUTPATIENT
Start: 2018-05-08

## 2018-05-08 RX ORDER — TOPIRAMATE 100 MG/1
100 TABLET, FILM COATED ORAL 2 TIMES DAILY
Qty: 60 TABLET | Refills: 5 | Status: SHIPPED | OUTPATIENT
Start: 2018-05-08 | End: 2018-11-04 | Stop reason: SDUPTHER

## 2018-05-08 RX ORDER — TRAZODONE HYDROCHLORIDE 100 MG/1
100 TABLET ORAL NIGHTLY
Qty: 90 TABLET | Refills: 1 | Status: SHIPPED | OUTPATIENT
Start: 2018-05-08 | End: 2019-01-08 | Stop reason: SDUPTHER

## 2018-05-08 NOTE — PROGRESS NOTES
Chief Complaint   Patient presents with   • Annual Exam       History of Present Illness  HM, Adult Male: The patient is being seen for a health maintenance evaluation. The last health maintenance visit was 1 year(s) ago.   Social History: Household members include spouse. He is , with 1 daughter, now a year old.  Work status: working full time as [employer], visits NH. The patient has never smoked cigarettes. He reports never drinking alcohol. He has never used illicit drugs.   General Health: The patient's health is described as good. He has regular dental visits. He denies vision problems. He denies hearing loss. Immunizations status: up to date.   Lifestyle: He consumes a diverse and healthy diet. He does not have any weight concerns. He exercises regularly. He does not use tobacco. He does report to occasional alcohol use. He denies drug use.   Screening: Cancer screening reviewed and current.   Metabolic screening reviewed and current.   Risk screening reviewed and current.   Sinuses still acting up, quit allergy shots, now on sudafed, and nasal rinse, and on meds    Review of Systems   Constitutional: Negative.  Negative for chills and fever.   HENT: Negative for ear discharge, ear pain, sinus pressure and sore throat.    Respiratory: Negative for cough, chest tightness and shortness of breath.    Cardiovascular: Negative for chest pain, palpitations and leg swelling.   Gastrointestinal: Negative for diarrhea, nausea and vomiting.   Musculoskeletal: Negative for arthralgias, back pain and myalgias.   Neurological: Negative for dizziness, syncope and headaches.   Psychiatric/Behavioral: Negative for confusion and sleep disturbance.       Patient Active Problem List   Diagnosis   • Dyslipidemia   • Thyroid nodule   • Vitamin D deficiency   • Left-sided low back pain with left-sided sciatica   • Non-healing skin lesion   • H/O splenectomy   • Intractable migraine without aura and with status  "migrainosus       Social History     Social History   • Marital status:      Spouse name: N/A   • Number of children: N/A   • Years of education: N/A     Occupational History   • Not on file.     Social History Main Topics   • Smoking status: Former Smoker     Types: Cigarettes     Quit date: 5/6/2009   • Smokeless tobacco: Former User      Comment: 11/05/2014 - never smoked tobacco    • Alcohol use No      Comment: beer on weekends   • Drug use: No   • Sexual activity: Defer     Other Topics Concern   • Not on file     Social History Narrative   • No narrative on file       Current Outpatient Prescriptions on File Prior to Visit   Medication Sig Dispense Refill   • Cholecalciferol (VITAMIN D) 2000 UNITS capsule Take  by mouth.     • levocetirizine (XYZAL) 5 MG tablet Take 5 mg by mouth every evening.     • Multiple Vitamins-Minerals (MULTI FOR HIM PO) Take 1 capsule by mouth daily.     • [DISCONTINUED] minocycline (MINOCIN,DYNACIN) 50 MG capsule TAKE 1 CAPSULE BY MOUTH TWICE DAILY 60 capsule 0   • [DISCONTINUED] Pseudoephedrine HCl ER (SUDAFED 24 HOUR NON-DROWSY) 240 MG tablet sustained-release 24 hour Take 1 tablet by mouth Daily. 30 each 5   • [DISCONTINUED] topiramate (TOPAMAX) 100 MG tablet Take 1 tablet by mouth 2 (Two) Times a Day. 60 tablet 5   • [DISCONTINUED] traZODone (DESYREL) 100 MG tablet TAKE 1 TABLET BY MOUTH EVERY NIGHT 90 tablet 0     No current facility-administered medications on file prior to visit.        Allergies   Allergen Reactions   • Other        /84   Pulse 108   Temp 98.6 °F (37 °C)   Ht 180.3 cm (71\")   Wt 75.3 kg (166 lb)   SpO2 98%   BMI 23.15 kg/m²          The following portions of the patient's history were reviewed and updated as appropriate: allergies, current medications, past family history, past medical history, past social history, past surgical history and problem list.    Physical Exam   Constitutional: He is oriented to person, place, and time. He " appears well-developed and well-nourished.   HENT:   Head: Normocephalic and atraumatic.   Right Ear: External ear normal. Tympanic membrane is bulging.   Left Ear: External ear normal. Tympanic membrane is bulging.   Mouth/Throat: Posterior oropharyngeal erythema present. No oropharyngeal exudate or tonsillar abscesses.   Eyes: Conjunctivae are normal. Pupils are equal, round, and reactive to light. No scleral icterus.   Neck: Normal range of motion. Neck supple. No thyromegaly present.   Cardiovascular: Normal rate, regular rhythm and intact distal pulses.    No murmur heard.  Pulmonary/Chest: Effort normal and breath sounds normal. No stridor. He has no wheezes. He has no rales.   Abdominal: Soft. Bowel sounds are normal. He exhibits no distension and no mass. There is no tenderness. There is no rebound.   Musculoskeletal: He exhibits no edema or tenderness.   Lymphadenopathy:     He has no cervical adenopathy.   Neurological: He is alert and oriented to person, place, and time. He displays normal reflexes. No cranial nerve deficit. Coordination normal.   Skin: Skin is warm and dry.   Psychiatric: He has a normal mood and affect. His behavior is normal. Judgment and thought content normal.   Nursing note and vitals reviewed.      Results for orders placed or performed in visit on 05/08/18   POCT urinalysis dipstick, automated   Result Value Ref Range    Color Yellow Yellow, Straw, Dark Yellow, Julia    Clarity, UA Clear Clear    Glucose, UA Negative Negative, 1000 mg/dL (3+) mg/dL    Bilirubin Negative Negative    Ketones, UA Negative Negative    Specific Gravity  1.015 1.005 - 1.030    Blood, UA Negative Negative    pH, Urine 6.5 5.0 - 8.0    Protein, POC Negative Negative mg/dL    Urobilinogen, UA Normal Normal    Leukocytes Negative Negative    Nitrite, UA Negative Negative   TB Skin Test   Result Value Ref Range    TB Skin Test      Induration  0 - 10 mm       Carson was seen today for annual  exam.    Diagnoses and all orders for this visit:    Annual physical exam  -     POCT urinalysis dipstick, automated  -     TB Skin Test  -     PSA Screen; Future    Vitamin D deficiency    Dyslipidemia    H/O splenectomy    IFG (impaired fasting glucose)  -     Hemoglobin A1c; Future    Thyroid nodule  -     TSH; Future  -     T4, Free; Future    Intractable migraine without aura and without status migrainosus  -     topiramate (TOPAMAX) 100 MG tablet; Take 1 tablet by mouth 2 (Two) Times a Day.    Chronic nonseasonal allergic rhinitis due to other allergen  -     Pseudoephedrine HCl ER (SUDAFED 24 HOUR NON-DROWSY) 240 MG tablet sustained-release 24 hour; Take 1 tablet by mouth Daily.  -     traZODone (DESYREL) 100 MG tablet; Take 1 tablet by mouth Every Night.    Other orders  -     Cancel: minocycline (MINOCIN,DYNACIN) 50 MG capsule; Take 1 capsule by mouth 2 (Two) Times a Day.  -     minocycline (MINOCIN,DYNACIN) 50 MG capsule; Take 1 capsule by mouth Daily.        Health Maintenance   Topic Date Due   • ANNUAL PHYSICAL  05/09/2018   • INFLUENZA VACCINE  08/01/2018   • TDAP/TD VACCINES (2 - Td) 04/26/2023       Discussion/Summary  Impression: health maintenance visit, healthy adult male.   Currently, he eats a healthy diet and has an adequate exercise regimen.   Prostate cancer screening: PSA was ordered for next visit.   Testicular cancer screening: monthly self testicular exam was advised.   Colorectal cancer screening: colonoscopy is indicated at 50.   Screening lab work includes glucose, lipid profile and 25-hydroxyvitamin D.   The immunizations are up to date.   Advice and education were given regarding cardiovascular risk reduction and self skin examination.   Patient discussion: discussed with the patient.     Return in about 6 months (around 11/8/2018) for Next scheduled follow up.

## 2018-05-09 PROCEDURE — 86580 TB INTRADERMAL TEST: CPT | Performed by: INTERNAL MEDICINE

## 2018-05-15 RX ORDER — MINOCYCLINE HYDROCHLORIDE 50 MG/1
CAPSULE ORAL
Qty: 60 CAPSULE | Refills: 0 | OUTPATIENT
Start: 2018-05-15

## 2018-05-19 RX ORDER — TRAZODONE HYDROCHLORIDE 100 MG/1
TABLET ORAL
Qty: 90 TABLET | Refills: 0 | Status: SHIPPED | OUTPATIENT
Start: 2018-05-19 | End: 2019-01-08

## 2018-06-12 ENCOUNTER — TELEPHONE (OUTPATIENT)
Dept: INTERNAL MEDICINE | Facility: CLINIC | Age: 43
End: 2018-06-12

## 2018-06-12 DIAGNOSIS — C44.91 SKIN CANCER, BASAL CELL: Primary | ICD-10-CM

## 2018-06-12 NOTE — TELEPHONE ENCOUNTER
PT WAS SEEN BY DR. MONDRAGON AND WAS REFERRED TO SEE A DERMATOLOGIST WHICH HAS . THE PT WAS NEEDING A NEW REFERRAL SENT TO FAX # [phone] WHICH WAS A REFERRAL TO DR. JEANCARLOS MONROE. BEST CALL BACK # FOR [phone]

## 2018-11-04 DIAGNOSIS — G43.019 INTRACTABLE MIGRAINE WITHOUT AURA AND WITHOUT STATUS MIGRAINOSUS: ICD-10-CM

## 2018-11-05 ENCOUNTER — TELEPHONE (OUTPATIENT)
Dept: INTERNAL MEDICINE | Facility: CLINIC | Age: 43
End: 2018-11-05

## 2018-11-05 RX ORDER — TOPIRAMATE 100 MG/1
TABLET, FILM COATED ORAL
Qty: 60 TABLET | Refills: 2 | Status: SHIPPED | OUTPATIENT
Start: 2018-11-05 | End: 2019-01-08

## 2018-11-05 RX ORDER — TRAZODONE HYDROCHLORIDE 100 MG/1
TABLET ORAL
Qty: 90 TABLET | Refills: 0 | Status: SHIPPED | OUTPATIENT
Start: 2018-11-05 | End: 2019-01-08

## 2018-11-05 NOTE — TELEPHONE ENCOUNTER
Called pt states he was seen in the ER on  11/01/18 was dx with kidney stone at [facility] and he has an appointment on  11/09/18 with urology. Pt just wanted to inform you. I know informed pt that if sx worsen or don't improve he need to seen or go to the ER.

## 2018-11-05 NOTE — TELEPHONE ENCOUNTER
PLEASE RETURN PT'S PHONE CALL; HE ONLY WANTED TO STATED THAT CALL IS CONCERNING A MEDICAL ISSUE [phone]

## 2018-12-27 ENCOUNTER — TELEPHONE (OUTPATIENT)
Dept: INTERNAL MEDICINE | Facility: CLINIC | Age: 43
End: 2018-12-27

## 2019-01-03 ENCOUNTER — TELEPHONE (OUTPATIENT)
Dept: INTERNAL MEDICINE | Facility: CLINIC | Age: 44
End: 2019-01-03

## 2019-01-03 NOTE — TELEPHONE ENCOUNTER
Please check if he can do 11.45 tomorrow or we can get him in Tuesday at 12.15- overbook.    Thank you

## 2019-01-03 NOTE — TELEPHONE ENCOUNTER
Spoke with patient he will do the Tuesday at 12:15 I will have Brooklynn or Jazlyn double book him for the appointment

## 2019-01-03 NOTE — TELEPHONE ENCOUNTER
PATIENT CALLED TO SCHEDULE AN APPT WITH DR. MONDRAGON FOR A SINUS INFECTION. HER NEXT AVAILABLE IS 1/16.. PATIENT DIDN'T WANT TO WAIT THAT LONG AND REFUSED TO SEE AN APRN OR PA. CAN WE FIT HIM IN THE SCHEDULE SOMETIME ON A Monday OR Tuesday AROUND NOON WORKS BEST FOR HIM. PLEASE ADVISE AND GIVE PT A CALL. THANKS.

## 2019-01-08 ENCOUNTER — OFFICE VISIT (OUTPATIENT)
Dept: INTERNAL MEDICINE | Facility: CLINIC | Age: 44
End: 2019-01-08

## 2019-01-08 VITALS
SYSTOLIC BLOOD PRESSURE: 120 MMHG | HEART RATE: 96 BPM | WEIGHT: 171 LBS | TEMPERATURE: 97.9 F | BODY MASS INDEX: 23.94 KG/M2 | HEIGHT: 71 IN | OXYGEN SATURATION: 98 % | DIASTOLIC BLOOD PRESSURE: 72 MMHG

## 2019-01-08 DIAGNOSIS — F51.01 PRIMARY INSOMNIA: ICD-10-CM

## 2019-01-08 DIAGNOSIS — G43.019 INTRACTABLE MIGRAINE WITHOUT AURA AND WITHOUT STATUS MIGRAINOSUS: ICD-10-CM

## 2019-01-08 DIAGNOSIS — J01.00 SUBACUTE MAXILLARY SINUSITIS: Primary | ICD-10-CM

## 2019-01-08 DIAGNOSIS — M54.2 CERVICALGIA: ICD-10-CM

## 2019-01-08 PROCEDURE — 99214 OFFICE O/P EST MOD 30 MIN: CPT | Performed by: INTERNAL MEDICINE

## 2019-01-08 RX ORDER — MINOCYCLINE HYDROCHLORIDE 50 MG/1
50 CAPSULE ORAL DAILY
Qty: 90 CAPSULE | Refills: 1 | Status: SHIPPED | OUTPATIENT
Start: 2019-01-08 | End: 2019-05-09 | Stop reason: SDUPTHER

## 2019-01-08 RX ORDER — METHYLPREDNISOLONE 4 MG/1
TABLET ORAL
Qty: 1 EACH | Refills: 0 | Status: SHIPPED | OUTPATIENT
Start: 2019-01-08 | End: 2019-03-01

## 2019-01-08 RX ORDER — TRAZODONE HYDROCHLORIDE 100 MG/1
100 TABLET ORAL NIGHTLY
Qty: 90 TABLET | Refills: 1 | Status: SHIPPED | OUTPATIENT
Start: 2019-01-08 | End: 2019-05-09 | Stop reason: SDUPTHER

## 2019-01-08 RX ORDER — SUMATRIPTAN 100 MG/1
100 TABLET, FILM COATED ORAL
Qty: 9 TABLET | Refills: 3 | Status: SHIPPED | OUTPATIENT
Start: 2019-01-08 | End: 2019-03-05

## 2019-01-08 RX ORDER — PSEUDOEPHEDRINE HYDROCHLORIDE 120 MG/1
TABLET, FILM COATED, EXTENDED RELEASE ORAL
Refills: 0 | COMMUNITY
Start: 2018-11-15 | End: 2019-01-08

## 2019-01-08 RX ORDER — AMOXICILLIN AND CLAVULANATE POTASSIUM 875; 125 MG/1; MG/1
1 TABLET, FILM COATED ORAL 2 TIMES DAILY
Qty: 20 TABLET | Refills: 0 | Status: SHIPPED | OUTPATIENT
Start: 2019-01-08 | End: 2019-03-01

## 2019-01-08 NOTE — PROGRESS NOTES
Sinusitis (x 3 weeks with headache, OTC meds not working)    Subjective   Carson Butler is a 43 y.o. male is here today for follow-up.    History of Present Illness   3 weeks, bloody nasal discharge, now a little better, and has yellowish and has sinus pressure.  Also needs refills.  Headaches are getting worse despite the topamax. Asking if needs another Neuro referral, as he did not go the last time.  S/p kidney stones and ureteroscopic stone extraction.  HAs some neck spasm, ? If triggering his migraine.    Current Outpatient Medications:   •  Cholecalciferol (VITAMIN D) 2000 UNITS capsule, Take  by mouth., Disp: , Rfl:   •  levocetirizine (XYZAL) 5 MG tablet, Take 5 mg by mouth every evening., Disp: , Rfl:   •  minocycline (MINOCIN,DYNACIN) 50 MG capsule, Take 1 capsule by mouth Daily., Disp: 90 capsule, Rfl: 1  •  Multiple Vitamins-Minerals (MULTI FOR HIM PO), Take 1 capsule by mouth daily., Disp: , Rfl:   •  Pseudoephedrine HCl  MG tablet sustained-release 24 hour, Take 1 tablet by mouth Daily., Disp: 30 each, Rfl: 5  •  topiramate (TOPAMAX) 200 MG tablet, Take 1 tablet by mouth Every Night., Disp: 90 tablet, Rfl: 1  •  traZODone (DESYREL) 100 MG tablet, Take 1 tablet by mouth Every Night., Disp: 90 tablet, Rfl: 1  •  amoxicillin-clavulanate (AUGMENTIN) 875-125 MG per tablet, Take 1 tablet by mouth 2 (Two) Times a Day., Disp: 20 tablet, Rfl: 0  •  MethylPREDNISolone (MEDROL, RAIN,) 4 MG tablet, Take as directed on package instructions., Disp: 1 each, Rfl: 0  •  SUMAtriptan (IMITREX) 100 MG tablet, Take one tablet at onset of headache. May repeat dose one time in 2 hours if headache not relieved., Disp: 9 tablet, Rfl: 3      The following portions of the patient's history were reviewed and updated as appropriate: allergies, current medications, past family history, past medical history, past social history, past surgical history and problem list.    Review of Systems   Constitutional: Negative.  Negative  "for chills and fever.   HENT: Positive for congestion, sinus pressure and sinus pain. Negative for ear discharge, ear pain and sore throat.    Respiratory: Negative for cough, chest tightness and shortness of breath.    Cardiovascular: Negative for chest pain, palpitations and leg swelling.   Gastrointestinal: Negative for diarrhea, nausea and vomiting.   Musculoskeletal: Positive for neck pain and neck stiffness. Negative for arthralgias, back pain and myalgias.   Neurological: Negative for dizziness, syncope and headaches.   Psychiatric/Behavioral: Negative for confusion and sleep disturbance.       Objective   /72   Pulse 96   Temp 97.9 °F (36.6 °C)   Ht 180.3 cm (71\")   Wt 77.6 kg (171 lb)   SpO2 98%   BMI 23.85 kg/m²   Physical Exam   Constitutional: He is oriented to person, place, and time. He appears well-developed and well-nourished.   HENT:   Head: Normocephalic and atraumatic.   Right Ear: External ear normal. Tympanic membrane is bulging.   Left Ear: External ear normal. Tympanic membrane is bulging.   Mouth/Throat: Posterior oropharyngeal erythema present. No oropharyngeal exudate or tonsillar abscesses.   Eyes: Conjunctivae are normal. Pupils are equal, round, and reactive to light.   Neck: Normal range of motion. Neck supple. No thyromegaly present.   Cardiovascular: Normal rate and regular rhythm.   Pulmonary/Chest: Effort normal and breath sounds normal. No stridor.   Abdominal: Soft. Bowel sounds are normal. He exhibits no distension. There is no tenderness.   Musculoskeletal: He exhibits no edema.   Lymphadenopathy:     He has cervical adenopathy.   Neurological: He is alert and oriented to person, place, and time. No cranial nerve deficit.   Skin: Skin is warm and dry.   Psychiatric: He has a normal mood and affect. Judgment normal.   Nursing note and vitals reviewed.        Results for orders placed or performed in visit on 05/08/18   POCT urinalysis dipstick, automated   Result " Value Ref Range    Color Yellow Yellow, Straw, Dark Yellow, Julia    Clarity, UA Clear Clear    Glucose, UA Negative Negative, 1000 mg/dL (3+) mg/dL    Bilirubin Negative Negative    Ketones, UA Negative Negative    Specific Gravity  1.015 1.005 - 1.030    Blood, UA Negative Negative    pH, Urine 6.5 5.0 - 8.0    Protein, POC Negative Negative mg/dL    Urobilinogen, UA Normal Normal    Leukocytes Negative Negative    Nitrite, UA Negative Negative   TB Skin Test   Result Value Ref Range    TB Skin Test      Induration  0 - 10 mm             Assessment/Plan   Diagnoses and all orders for this visit:    Subacute maxillary sinusitis  -     amoxicillin-clavulanate (AUGMENTIN) 875-125 MG per tablet; Take 1 tablet by mouth 2 (Two) Times a Day.  -     MethylPREDNISolone (MEDROL, RAIN,) 4 MG tablet; Take as directed on package instructions.  -     minocycline (MINOCIN,DYNACIN) 50 MG capsule; Take 1 capsule by mouth Daily.  -     Pseudoephedrine HCl  MG tablet sustained-release 24 hour; Take 1 tablet by mouth Daily.    Intractable migraine without aura and without status migrainosus  -     topiramate (TOPAMAX) 200 MG tablet; Take 1 tablet by mouth Every Night.  -     Ambulatory Referral to Neurology  -     SUMAtriptan (IMITREX) 100 MG tablet; Take one tablet at onset of headache. May repeat dose one time in 2 hours if headache not relieved.    Primary insomnia  -     traZODone (DESYREL) 100 MG tablet; Take 1 tablet by mouth Every Night.    Cervicalgia  Comments:  heat and ice.  prn advil.    Other orders  -     Discontinue: SUDAFED 12 HOUR 120 MG 12 hr tablet; TK 1 T PO QD      Would like to have a vasectomy, adv. To d/w his urologist.       Return in about 5 months (around 6/8/2019) for Annual.

## 2019-01-14 RX ORDER — MINOCYCLINE HYDROCHLORIDE 50 MG/1
50 CAPSULE ORAL DAILY
Qty: 90 CAPSULE | Refills: 0 | OUTPATIENT
Start: 2019-01-14

## 2019-02-04 RX ORDER — TRAZODONE HYDROCHLORIDE 100 MG/1
TABLET ORAL
Qty: 90 TABLET | Refills: 0 | OUTPATIENT
Start: 2019-02-04

## 2019-02-05 ENCOUNTER — OFFICE VISIT (OUTPATIENT)
Dept: NEUROLOGY | Facility: CLINIC | Age: 44
End: 2019-02-05

## 2019-02-05 VITALS
SYSTOLIC BLOOD PRESSURE: 122 MMHG | DIASTOLIC BLOOD PRESSURE: 70 MMHG | BODY MASS INDEX: 24.14 KG/M2 | WEIGHT: 172.4 LBS | HEIGHT: 71 IN

## 2019-02-05 DIAGNOSIS — G43.719 INTRACTABLE CHRONIC MIGRAINE WITHOUT AURA AND WITHOUT STATUS MIGRAINOSUS: Primary | ICD-10-CM

## 2019-02-05 PROCEDURE — 99214 OFFICE O/P EST MOD 30 MIN: CPT | Performed by: PSYCHIATRY & NEUROLOGY

## 2019-02-06 ENCOUNTER — TELEPHONE (OUTPATIENT)
Dept: NEUROLOGY | Facility: CLINIC | Age: 44
End: 2019-02-06

## 2019-02-06 RX ORDER — DEXAMETHASONE 1 MG
1 TABLET ORAL 2 TIMES DAILY WITH MEALS
Qty: 4 TABLET | Refills: 0 | Status: SHIPPED | OUTPATIENT
Start: 2019-02-06 | End: 2019-02-08

## 2019-02-06 RX ORDER — DEXAMETHASONE 1 MG
0.5 TABLET ORAL 2 TIMES DAILY WITH MEALS
Qty: 2 TABLET | Refills: 0 | Status: SHIPPED | OUTPATIENT
Start: 2019-02-06 | End: 2019-02-08

## 2019-02-06 RX ORDER — DEXAMETHASONE 1.5 MG/1
1.5 TABLET ORAL 2 TIMES DAILY WITH MEALS
Qty: 4 TABLET | Refills: 0 | Status: SHIPPED | OUTPATIENT
Start: 2019-02-06 | End: 2019-02-08

## 2019-02-06 NOTE — TELEPHONE ENCOUNTER
Pt calling because he thought you were prescribing dexamethasone in tapering dose & it wasn't at the pharm to pickup. Please advise.

## 2019-03-01 ENCOUNTER — OFFICE VISIT (OUTPATIENT)
Dept: INTERNAL MEDICINE | Facility: CLINIC | Age: 44
End: 2019-03-01

## 2019-03-01 VITALS
OXYGEN SATURATION: 99 % | HEART RATE: 113 BPM | WEIGHT: 172 LBS | BODY MASS INDEX: 24.08 KG/M2 | HEIGHT: 71 IN | TEMPERATURE: 101.1 F

## 2019-03-01 DIAGNOSIS — R05.9 COUGH: Primary | ICD-10-CM

## 2019-03-01 DIAGNOSIS — J10.1 INFLUENZA A: ICD-10-CM

## 2019-03-01 LAB
EXPIRATION DATE: ABNORMAL
FLUAV AG NPH QL: POSITIVE
FLUBV AG NPH QL: NEGATIVE
INTERNAL CONTROL: ABNORMAL
Lab: ABNORMAL

## 2019-03-01 PROCEDURE — 99213 OFFICE O/P EST LOW 20 MIN: CPT | Performed by: NURSE PRACTITIONER

## 2019-03-01 PROCEDURE — 87804 INFLUENZA ASSAY W/OPTIC: CPT | Performed by: NURSE PRACTITIONER

## 2019-03-01 RX ORDER — OSELTAMIVIR PHOSPHATE 75 MG/1
75 CAPSULE ORAL 2 TIMES DAILY
Qty: 10 CAPSULE | Refills: 0 | Status: SHIPPED | OUTPATIENT
Start: 2019-03-01 | End: 2019-03-06

## 2019-03-01 NOTE — PROGRESS NOTES
Subjective   Carson Butler is a 43 y.o. male.   Chief Complaint   Patient presents with   • Chills   • Generalized Body Aches   • Cough   • Fever   • Nasal Congestion      History of Present Illness Onset yesterday.  As above,  No HA, ear pain,.  Some SOA when waking up.  Had abd pain RLQ x2-3 min this AM-resolved.  NO NVD.  He did have vaccine.  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    The following portions of the patient's history were reviewed and updated as appropriate: allergies, current medications, past family history, past medical history, past social history, past surgical history and problem list.    Current Outpatient Medications:   •  Cholecalciferol (VITAMIN D) 2000 UNITS capsule, Take  by mouth., Disp: , Rfl:   •  Fremanezumab-vfrm 225 MG/1.5ML solution prefilled syringe, Inject 225 mg under the skin into the appropriate area as directed Every 30 (Thirty) Days., Disp: 1 syringe, Rfl: 12  •  levocetirizine (XYZAL) 5 MG tablet, Take 5 mg by mouth every evening., Disp: , Rfl:   •  minocycline (MINOCIN,DYNACIN) 50 MG capsule, Take 1 capsule by mouth Daily., Disp: 90 capsule, Rfl: 1  •  Multiple Vitamins-Minerals (MULTI FOR HIM PO), Take 1 capsule by mouth daily., Disp: , Rfl:   •  Pseudoephedrine HCl  MG tablet sustained-release 24 hour, Take 1 tablet by mouth Daily., Disp: 30 each, Rfl: 5  •  SUMAtriptan (IMITREX) 100 MG tablet, Take one tablet at onset of headache. May repeat dose one time in 2 hours if headache not relieved., Disp: 9 tablet, Rfl: 3  •  topiramate (TOPAMAX) 200 MG tablet, Take 1 tablet by mouth Every Night., Disp: 90 tablet, Rfl: 1  •  traZODone (DESYREL) 100 MG tablet, Take 1 tablet by mouth Every Night., Disp: 90 tablet, Rfl: 1  •  oseltamivir (TAMIFLU) 75 MG capsule, Take 1 capsule by mouth 2 (Two) Times a Day for 5 days., Disp: 10 capsule, Rfl: 0    Review of Systems Consitutional, HEENT, Respiratory, CV, GI, , Skin, Musculoskeletal, Neuro-mental, Endocrinological,  "Hematological were reviewed.  Positives were discussed in the HPI, otherwise ROS was negative   Pulse 113   Temp (!) 101.1 °F (38.4 °C)   Ht 180.3 cm (71\")   Wt 78 kg (172 lb)   SpO2 99%   BMI 23.99 kg/m²     Objective   Allergies   Allergen Reactions   • Other Itching     Seasonal...grass, pollen       Physical Exam   Constitutional: He is oriented to person, place, and time. He appears well-developed and well-nourished.   Appears not to feel well.   HENT:   Head: Normocephalic.   Right Ear: External ear normal.   Left Ear: External ear normal.   Mouth/Throat: Oropharynx is clear and moist.   TMs are dull.  Throat with PND.  Nontender over frontal or maxillary sinuses.  Nasal mucosa pink with clear sinus drainage   Eyes: Right eye exhibits no discharge. Left eye exhibits no discharge.   Neck: Neck supple.   Shotty nodes   Cardiovascular: Normal heart sounds and intact distal pulses. Exam reveals no gallop and no friction rub.   No murmur heard.  Tachycardic regular rhythm-he is febrile.   Pulmonary/Chest: Effort normal and breath sounds normal. No stridor. No respiratory distress. He has no wheezes. He has no rales.   Abdominal: Soft. He exhibits no mass. There is no tenderness.   Neurological: He is alert and oriented to person, place, and time.   Skin: Skin is warm and dry. Capillary refill takes less than 2 seconds.   Is pink, no rash    Nursing note and vitals reviewed.      Procedures    LABS  Results for orders placed or performed in visit on 03/01/19   POCT Influenza A/B   Result Value Ref Range    Rapid Influenza A Ag Positive (A) Negative    Rapid Influenza B Ag Negative Negative    Internal Control Passed Passed    Lot Number [id number]     Expiration Date 9/21/21        Assessment/Plan   Carson was seen today for chills, generalized body aches, cough, fever and nasal congestion.    Diagnoses and all orders for this visit:    Cough  -     POCT Influenza A/B    Influenza A  -     POCT Influenza A/B  - "     oseltamivir (TAMIFLU) 75 MG capsule; Take 1 capsule by mouth 2 (Two) Times a Day for 5 days.      Patient Instructions   Drink plenty of fluids.  Tylenol for pain/fever.  Robitussin DM for cough.  Claritin as discussed.  Tamiflu as discussed.  Recommend getting both doses in today.  Off from work through Monday  FU with Dr Goodson  if not steadily improving.Pt verbalizes understanding and agreement with plan of care.       EMR Dragon/transcription disclaimer:  Please note that portions of this note were completed with a voice recognition program.  Electronic transcription of the voice recognition program may permit erroneous words or phrases to be inadvertently transcribed.  Although I have reviewed the note for such errors, some may still exist in this documentation     TEE Dominguez

## 2019-03-01 NOTE — PATIENT INSTRUCTIONS
Drink plenty of fluids.  Tylenol for pain/fever.  Robitussin DM for cough.  Claritin as discussed.  Tamiflu as discussed.  Recommend getting both doses in today.  Off from work through Monday  FU with Dr Goodson  if not steadily improving.Pt verbalizes understanding and agreement with plan of care.

## 2019-03-05 ENCOUNTER — OFFICE VISIT (OUTPATIENT)
Dept: NEUROLOGY | Facility: CLINIC | Age: 44
End: 2019-03-05

## 2019-03-05 VITALS
BODY MASS INDEX: 24.08 KG/M2 | HEIGHT: 71 IN | DIASTOLIC BLOOD PRESSURE: 76 MMHG | WEIGHT: 172 LBS | SYSTOLIC BLOOD PRESSURE: 132 MMHG

## 2019-03-05 DIAGNOSIS — G43.719 INTRACTABLE CHRONIC MIGRAINE WITHOUT AURA AND WITHOUT STATUS MIGRAINOSUS: Primary | ICD-10-CM

## 2019-03-05 PROCEDURE — 99213 OFFICE O/P EST LOW 20 MIN: CPT | Performed by: PSYCHIATRY & NEUROLOGY

## 2019-03-05 RX ORDER — RIZATRIPTAN BENZOATE 10 MG/1
10 TABLET ORAL ONCE AS NEEDED
Qty: 9 TABLET | Refills: 3 | Status: SHIPPED | OUTPATIENT
Start: 2019-03-05 | End: 2019-04-04
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
## 2019-05-02 ENCOUNTER — LAB (OUTPATIENT)
Dept: INTERNAL MEDICINE | Facility: CLINIC | Age: 44
End: 2019-05-02

## 2019-05-02 DIAGNOSIS — Z00.00 ANNUAL PHYSICAL EXAM: ICD-10-CM

## 2019-05-02 DIAGNOSIS — R73.01 IFG (IMPAIRED FASTING GLUCOSE): ICD-10-CM

## 2019-05-02 DIAGNOSIS — E04.1 THYROID NODULE: ICD-10-CM

## 2019-05-02 LAB
HBA1C MFR BLD: 5.79 % (ref 4.8–5.6)
PSA SERPL-MCNC: 0.56 NG/ML (ref 0–4)
T4 FREE SERPL-MCNC: 1.25 NG/DL (ref 0.93–1.7)
TSH SERPL DL<=0.05 MIU/L-ACNC: 0.52 MIU/ML (ref 0.27–4.2)

## 2019-05-02 PROCEDURE — 83036 HEMOGLOBIN GLYCOSYLATED A1C: CPT | Performed by: INTERNAL MEDICINE

## 2019-05-02 PROCEDURE — 84443 ASSAY THYROID STIM HORMONE: CPT | Performed by: INTERNAL MEDICINE

## 2019-05-02 PROCEDURE — 84439 ASSAY OF FREE THYROXINE: CPT | Performed by: INTERNAL MEDICINE

## 2019-05-02 PROCEDURE — G0103 PSA SCREENING: HCPCS | Performed by: INTERNAL MEDICINE

## 2019-05-07 ENCOUNTER — OFFICE VISIT (OUTPATIENT)
Dept: INTERNAL MEDICINE | Facility: CLINIC | Age: 44
End: 2019-05-07

## 2019-05-07 ENCOUNTER — OFFICE VISIT (OUTPATIENT)
Dept: NEUROLOGY | Facility: CLINIC | Age: 44
End: 2019-05-07

## 2019-05-07 VITALS
SYSTOLIC BLOOD PRESSURE: 116 MMHG | DIASTOLIC BLOOD PRESSURE: 78 MMHG | HEIGHT: 71 IN | BODY MASS INDEX: 24.08 KG/M2 | WEIGHT: 172 LBS

## 2019-05-07 DIAGNOSIS — Z11.1 VISIT FOR TB SKIN TEST: Primary | ICD-10-CM

## 2019-05-07 DIAGNOSIS — G43.719 INTRACTABLE CHRONIC MIGRAINE WITHOUT AURA AND WITHOUT STATUS MIGRAINOSUS: Primary | ICD-10-CM

## 2019-05-07 DIAGNOSIS — G43.019 INTRACTABLE MIGRAINE WITHOUT AURA AND WITHOUT STATUS MIGRAINOSUS: ICD-10-CM

## 2019-05-07 PROCEDURE — 86580 TB INTRADERMAL TEST: CPT | Performed by: INTERNAL MEDICINE

## 2019-05-07 PROCEDURE — 99213 OFFICE O/P EST LOW 20 MIN: CPT | Performed by: PSYCHIATRY & NEUROLOGY

## 2019-05-07 PROCEDURE — 99211 OFF/OP EST MAY X REQ PHY/QHP: CPT | Performed by: INTERNAL MEDICINE
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
## 2019-05-09 ENCOUNTER — OFFICE VISIT (OUTPATIENT)
Dept: INTERNAL MEDICINE | Facility: CLINIC | Age: 44
End: 2019-05-09

## 2019-05-09 VITALS
OXYGEN SATURATION: 99 % | WEIGHT: 169 LBS | HEIGHT: 71 IN | BODY MASS INDEX: 23.66 KG/M2 | DIASTOLIC BLOOD PRESSURE: 84 MMHG | SYSTOLIC BLOOD PRESSURE: 108 MMHG | HEART RATE: 100 BPM

## 2019-05-09 DIAGNOSIS — Z00.00 ANNUAL PHYSICAL EXAM: Primary | ICD-10-CM

## 2019-05-09 DIAGNOSIS — J01.00 SUBACUTE MAXILLARY SINUSITIS: ICD-10-CM

## 2019-05-09 DIAGNOSIS — Z90.81 H/O SPLENECTOMY: ICD-10-CM

## 2019-05-09 DIAGNOSIS — G43.011 INTRACTABLE MIGRAINE WITHOUT AURA AND WITH STATUS MIGRAINOSUS: ICD-10-CM

## 2019-05-09 DIAGNOSIS — E78.5 DYSLIPIDEMIA: ICD-10-CM

## 2019-05-09 DIAGNOSIS — F51.01 PRIMARY INSOMNIA: ICD-10-CM

## 2019-05-09 LAB
ALBUMIN SERPL-MCNC: 4.5 G/DL (ref 3.5–5.2)
ALBUMIN/GLOB SERPL: 1.6 G/DL
ALP SERPL-CCNC: 54 U/L (ref 39–117)
ALT SERPL W P-5'-P-CCNC: 21 U/L (ref 1–41)
ANION GAP SERPL CALCULATED.3IONS-SCNC: 9.8 MMOL/L
AST SERPL-CCNC: 15 U/L (ref 1–40)
BILIRUB SERPL-MCNC: 0.4 MG/DL (ref 0.2–1.2)
BUN BLD-MCNC: 18 MG/DL (ref 6–20)
BUN/CREAT SERPL: 19.1 (ref 7–25)
CALCIUM SPEC-SCNC: 9.5 MG/DL (ref 8.6–10.5)
CHLORIDE SERPL-SCNC: 105 MMOL/L (ref 98–107)
CHOLEST SERPL-MCNC: 194 MG/DL (ref 0–200)
CO2 SERPL-SCNC: 25.2 MMOL/L (ref 22–29)
CREAT BLD-MCNC: 0.94 MG/DL (ref 0.76–1.27)
DEPRECATED RDW RBC AUTO: 48.1 FL (ref 37–54)
ERYTHROCYTE [DISTWIDTH] IN BLOOD BY AUTOMATED COUNT: 14.1 % (ref 12.3–15.4)
GFR SERPL CREATININE-BSD FRML MDRD: 87 ML/MIN/1.73
GLOBULIN UR ELPH-MCNC: 2.9 GM/DL
GLUCOSE BLD-MCNC: 89 MG/DL (ref 65–99)
HCT VFR BLD AUTO: 44.2 % (ref 37.5–51)
HDLC SERPL-MCNC: 42 MG/DL (ref 40–60)
HGB BLD-MCNC: 14.1 G/DL (ref 13–17.7)
INDURATION: 0 MM (ref 0–10)
LDLC SERPL CALC-MCNC: 124 MG/DL (ref 0–100)
LDLC/HDLC SERPL: 2.95 {RATIO}
Lab: NORMAL
Lab: NORMAL
MCH RBC QN AUTO: 29.6 PG (ref 26.6–33)
MCHC RBC AUTO-ENTMCNC: 31.9 G/DL (ref 31.5–35.7)
MCV RBC AUTO: 92.7 FL (ref 79–97)
PLATELET # BLD AUTO: 369 10*3/MM3 (ref 140–450)
PMV BLD AUTO: 9.9 FL (ref 6–12)
POTASSIUM BLD-SCNC: 4.6 MMOL/L (ref 3.5–5.2)
PROT SERPL-MCNC: 7.4 G/DL (ref 6–8.5)
RBC # BLD AUTO: 4.77 10*6/MM3 (ref 4.14–5.8)
SODIUM BLD-SCNC: 140 MMOL/L (ref 136–145)
TB SKIN TEST: NEGATIVE
TRIGL SERPL-MCNC: 141 MG/DL (ref 0–150)
VLDLC SERPL-MCNC: 28.2 MG/DL (ref 5–40)
WBC NRBC COR # BLD: 8.65 10*3/MM3 (ref 3.4–10.8)

## 2019-05-09 PROCEDURE — 80053 COMPREHEN METABOLIC PANEL: CPT | Performed by: INTERNAL MEDICINE

## 2019-05-09 PROCEDURE — 90471 IMMUNIZATION ADMIN: CPT | Performed by: INTERNAL MEDICINE

## 2019-05-09 PROCEDURE — 85027 COMPLETE CBC AUTOMATED: CPT | Performed by: INTERNAL MEDICINE

## 2019-05-09 PROCEDURE — 80061 LIPID PANEL: CPT | Performed by: INTERNAL MEDICINE

## 2019-05-09 PROCEDURE — 99396 PREV VISIT EST AGE 40-64: CPT | Performed by: INTERNAL MEDICINE

## 2019-05-09 PROCEDURE — 90632 HEPA VACCINE ADULT IM: CPT | Performed by: INTERNAL MEDICINE

## 2019-05-09 RX ORDER — MINOCYCLINE HYDROCHLORIDE 50 MG/1
50 CAPSULE ORAL DAILY
Qty: 90 CAPSULE | Refills: 1 | Status: SHIPPED | OUTPATIENT
Start: 2019-05-09 | End: 2019-08-13

## 2019-05-09 RX ORDER — TRAZODONE HYDROCHLORIDE 100 MG/1
100 TABLET ORAL NIGHTLY
Qty: 90 TABLET | Refills: 1 | Status: SHIPPED | OUTPATIENT
Start: 2019-05-09 | End: 2019-12-31

## 2019-05-09 NOTE — PROGRESS NOTES
TB skin test administered - left fore arm at 12.40 pm on 5/7/19 .  Site marked.  Diagnoses and all orders for this visit:    Visit for TB skin test    As above

## 2019-05-09 NOTE — PROGRESS NOTES
Chief Complaint   Patient presents with   • Annual Exam       History of Present Illness  HM, Adult Male: The patient is being seen for a health maintenance evaluation. The last health maintenance visit was 1 year(s) ago.   Social History: Household members include spouse. He is , with 1 daughter, now 2 year old.  Work status: working full time as [employer], visits NH. The patient has never smoked cigarettes. He reports never drinking alcohol. He has never used illicit drugs.   General Health: The patient's health is described as good. He has regular dental visits. He denies vision problems. He denies hearing loss. Immunizations status: up to date.   Lifestyle: He consumes a diverse and healthy diet. He does not have any weight concerns. He exercises regularly. He does not use tobacco. He does report to occasional alcohol use. He denies drug use.   Screening: Cancer screening reviewed and current.   Metabolic screening reviewed and current.   Risk screening reviewed and current.     Eating healthy, maintaining good health. Working nights. Daughter not in . Parents alternating.  Review of Systems   Constitutional: Negative.  Negative for chills and fever.   HENT: Positive for congestion. Negative for ear discharge, ear pain, sinus pressure and sore throat.    Respiratory: Negative for cough, chest tightness and shortness of breath.    Cardiovascular: Negative for chest pain, palpitations and leg swelling.   Gastrointestinal: Negative for diarrhea, nausea and vomiting.   Musculoskeletal: Negative for arthralgias, back pain and myalgias.   Neurological: Negative for dizziness, syncope and headaches.   Psychiatric/Behavioral: Negative for confusion and sleep disturbance.       Patient Active Problem List   Diagnosis   • Dyslipidemia   • Thyroid nodule   • Vitamin D deficiency   • Left-sided low back pain with left-sided sciatica   • Non-healing skin lesion   • H/O splenectomy   • Intractable  "migraine without aura and with status migrainosus   • Influenza A       Social History     Socioeconomic History   • Marital status:      Spouse name: Not on file   • Number of children: Not on file   • Years of education: Not on file   • Highest education level: Not on file   Tobacco Use   • Smoking status: Former Smoker     Packs/day: 0.50     Years: 15.00     Pack years: 7.50     Types: Cigarettes     Start date: 12/29/1992     Last attempt to quit: 5/6/2009     Years since quitting: 10.0   • Smokeless tobacco: Former User   • Tobacco comment: No longer a smoker. Have not smoked in 9 years.   Substance and Sexual Activity   • Alcohol use: Yes     Types: 4 Cans of beer per week     Comment: Casual drinker. Not 4 beers every week.   • Drug use: No   • Sexual activity: Yes     Partners: Female     Birth control/protection: IUD     Comment: Wife has IUD       Current Outpatient Medications on File Prior to Visit   Medication Sig Dispense Refill   • Cholecalciferol (VITAMIN D) 2000 UNITS capsule Take  by mouth.     • Fremanezumab-vfrm 225 MG/1.5ML solution prefilled syringe Inject 225 mg under the skin into the appropriate area as directed Every 30 (Thirty) Days. 1 syringe 12   • levocetirizine (XYZAL) 5 MG tablet Take 5 mg by mouth every evening.     • Multiple Vitamins-Minerals (MULTI FOR HIM PO) Take 1 capsule by mouth daily.     • Pseudoephedrine HCl  MG tablet sustained-release 24 hour Take 1 tablet by mouth Daily. 30 each 5   • psyllium (METAMUCIL) 58.6 % packet Take 1 packet by mouth Daily.     • topiramate (TOPAMAX) 200 MG tablet Take 1 tablet by mouth Every Night. 30 tablet 3   • SUDAFED 24 HOUR NON-DROWSY 240 MG tablet sustained-release 24 hour Take 1 tablet by mouth Daily As Needed.  5     No current facility-administered medications on file prior to visit.        Allergies   Allergen Reactions   • Other Itching     Seasonal...grass, pollen       /84   Pulse 100   Ht 180.3 cm (71\")   " Wt 76.7 kg (169 lb)   SpO2 99% Comment: ra  BMI 23.57 kg/m²          The following portions of the patient's history were reviewed and updated as appropriate: allergies, current medications, past family history, past medical history, past social history, past surgical history and problem list.    Physical Exam   Constitutional: He is oriented to person, place, and time. He appears well-developed and well-nourished.   HENT:   Head: Normocephalic and atraumatic.   Right Ear: External ear normal.   Left Ear: External ear normal.   Mouth/Throat: No oropharyngeal exudate.   Eyes: Conjunctivae are normal. Pupils are equal, round, and reactive to light. No scleral icterus.   Neck: Neck supple. No thyromegaly present.   Cardiovascular: Normal rate, regular rhythm and intact distal pulses. Exam reveals no friction rub.   No murmur heard.  Pulmonary/Chest: Effort normal and breath sounds normal. He has no wheezes. He has no rales.   Abdominal: Soft. Bowel sounds are normal. He exhibits no distension and no mass. There is no tenderness. There is no rebound.   Musculoskeletal: He exhibits no edema.   Lymphadenopathy:     He has no cervical adenopathy.   Neurological: He is alert and oriented to person, place, and time. He displays normal reflexes. No cranial nerve deficit. He exhibits normal muscle tone. Coordination normal.   Skin: Skin is warm and dry.   Psychiatric: He has a normal mood and affect. His behavior is normal. Judgment and thought content normal.   Nursing note and vitals reviewed.      Results for orders placed or performed in visit on 05/09/19   CBC (No Diff)   Result Value Ref Range    WBC 8.65 3.40 - 10.80 10*3/mm3    RBC 4.77 4.14 - 5.80 10*6/mm3    Hemoglobin 14.1 13.0 - 17.7 g/dL    Hematocrit 44.2 37.5 - 51.0 %    MCV 92.7 79.0 - 97.0 fL    MCH 29.6 26.6 - 33.0 pg    MCHC 31.9 31.5 - 35.7 g/dL    RDW 14.1 12.3 - 15.4 %    RDW-SD 48.1 37.0 - 54.0 fl    MPV 9.9 6.0 - 12.0 fL    Platelets 369 140 - 450  10*3/mm3   Comprehensive Metabolic Panel   Result Value Ref Range    Glucose 89 65 - 99 mg/dL    BUN 18 6 - 20 mg/dL    Creatinine 0.94 0.76 - 1.27 mg/dL    Sodium 140 136 - 145 mmol/L    Potassium 4.6 3.5 - 5.2 mmol/L    Chloride 105 98 - 107 mmol/L    CO2 25.2 22.0 - 29.0 mmol/L    Calcium 9.5 8.6 - 10.5 mg/dL    Total Protein 7.4 6.0 - 8.5 g/dL    Albumin 4.50 3.50 - 5.20 g/dL    ALT (SGPT) 21 1 - 41 U/L    AST (SGOT) 15 1 - 40 U/L    Alkaline Phosphatase 54 39 - 117 U/L    Total Bilirubin 0.4 0.2 - 1.2 mg/dL    eGFR Non African Amer 87 >60 mL/min/1.73    Globulin 2.9 gm/dL    A/G Ratio 1.6 g/dL    BUN/Creatinine Ratio 19.1 7.0 - 25.0    Anion Gap 9.8 mmol/L   Lipid Panel   Result Value Ref Range    Total Cholesterol 194 0 - 200 mg/dL    Triglycerides 141 0 - 150 mg/dL    HDL Cholesterol 42 40 - 60 mg/dL    LDL Cholesterol  124 (H) 0 - 100 mg/dL    VLDL Cholesterol 28.2 5 - 40 mg/dL    LDL/HDL Ratio 2.95    TB Skin Test   Result Value Ref Range    TB Skin Test Negative     Induration 0 0 - 10 mm    Injection Date & Time 5/7/19 @ 12.40     Read Date & Time 5/9/19 @ 13.46      TB test read- 0 mm induration.    Carson was seen today for annual exam.    Diagnoses and all orders for this visit:    Annual physical exam  -     Cancel: TB Skin Test  -     TB Skin Test  -     CBC (No Diff)  -     Comprehensive Metabolic Panel  -     Lipid Panel  -     Hepatitis A Vaccine Adult IM    Intractable migraine without aura and with status migrainosus    H/O splenectomy  Comments:  utd vaccinations    Dyslipidemia  -     Lipid Panel    Subacute maxillary sinusitis  -     minocycline (MINOCIN,DYNACIN) 50 MG capsule; Take 1 capsule by mouth Daily.    Primary insomnia  -     traZODone (DESYREL) 100 MG tablet; Take 1 tablet by mouth Every Night.        Health Maintenance   Topic Date Due   • INFLUENZA VACCINE  08/01/2019   • ANNUAL PHYSICAL  05/10/2020   • TDAP/TD VACCINES (2 - Td) 04/26/2023       Discussion/Summary  Impression:  health maintenance visit, healthy adult male.   Currently, he eats a healthy diet and has an adequate exercise regimen.   Prostate cancer screening: PSA was ordered.   Testicular cancer screening: monthly self testicular exam was advised.   Colorectal cancer screening: fecal occult blood testing is needed every year and colonoscopy is due at 50 .   Screening lab work includes glucose, lipid profile and 25-hydroxyvitamin D.   The immunizations are up to date.   Advice and education were given regarding cardiovascular risk reduction, healthy dietary habits, Seatbelt and helmet use and self skin examination.        Return in about 1 year (around 5/9/2020) for Annual.

## 2019-06-27 DIAGNOSIS — G43.019 INTRACTABLE MIGRAINE WITHOUT AURA AND WITHOUT STATUS MIGRAINOSUS: ICD-10-CM

## 2019-07-15 DIAGNOSIS — J01.00 SUBACUTE MAXILLARY SINUSITIS: ICD-10-CM

## 2019-08-12 ENCOUNTER — TELEPHONE (OUTPATIENT)
Dept: INTERNAL MEDICINE | Facility: CLINIC | Age: 44
End: 2019-08-12

## 2019-08-12 NOTE — TELEPHONE ENCOUNTER
Patient would like to be able to see Dr Goodson tomorrow for sinus infection. An appt was offered with an extender and declined. He states he has been having symptoms for about three weeks and is sure he has a sinus infection.    Call back [phone]

## 2019-08-13 ENCOUNTER — OFFICE VISIT (OUTPATIENT)
Dept: INTERNAL MEDICINE | Facility: CLINIC | Age: 44
End: 2019-08-13

## 2019-08-13 VITALS
HEART RATE: 95 BPM | OXYGEN SATURATION: 99 % | HEIGHT: 71 IN | BODY MASS INDEX: 24.02 KG/M2 | WEIGHT: 171.6 LBS | SYSTOLIC BLOOD PRESSURE: 112 MMHG | DIASTOLIC BLOOD PRESSURE: 82 MMHG

## 2019-08-13 DIAGNOSIS — J01.01 ACUTE RECURRENT MAXILLARY SINUSITIS: Primary | ICD-10-CM

## 2019-08-13 DIAGNOSIS — J01.00 SUBACUTE MAXILLARY SINUSITIS: ICD-10-CM

## 2019-08-13 PROCEDURE — 99213 OFFICE O/P EST LOW 20 MIN: CPT | Performed by: INTERNAL MEDICINE

## 2019-08-13 RX ORDER — METHYLPREDNISOLONE 4 MG/1
TABLET ORAL
Qty: 21 TABLET | Refills: 0 | Status: SHIPPED | OUTPATIENT
Start: 2019-08-13 | End: 2019-08-27 | Stop reason: SDUPTHER

## 2019-08-13 RX ORDER — MINOCYCLINE HYDROCHLORIDE 50 MG/1
100 CAPSULE ORAL DAILY
Qty: 180 CAPSULE | Refills: 1 | Status: SHIPPED | OUTPATIENT
Start: 2019-08-13 | End: 2019-11-03

## 2019-08-13 RX ORDER — AMOXICILLIN AND CLAVULANATE POTASSIUM 875; 125 MG/1; MG/1
1 TABLET, FILM COATED ORAL 2 TIMES DAILY
Qty: 20 TABLET | Refills: 0 | Status: SHIPPED | OUTPATIENT
Start: 2019-08-13 | End: 2019-08-27

## 2019-08-13 NOTE — PROGRESS NOTES
sinus pressure (brown and bloody production psat 3-4 weeks)    Subjective   Carson Butler is a 43 y.o. male is here today for follow-up.    History of Present Illness   Carson, notes that he has been congested, with purulent sputum  3 weeks. Some fatigue, no fevers chills. + sinus pressure.  Also, increased breakouts. Minocin 50 not helping.    Current Outpatient Medications:   •  Cholecalciferol (VITAMIN D) 2000 UNITS capsule, Take  by mouth., Disp: , Rfl:   •  Fremanezumab-vfrm 225 MG/1.5ML solution prefilled syringe, Inject 225 mg under the skin into the appropriate area as directed Every 30 (Thirty) Days., Disp: 1 syringe, Rfl: 12  •  levocetirizine (XYZAL) 5 MG tablet, Take 5 mg by mouth every evening., Disp: , Rfl:   •  minocycline (MINOCIN,DYNACIN) 50 MG capsule, Take 2 capsules by mouth Daily., Disp: 180 capsule, Rfl: 1  •  Multiple Vitamins-Minerals (MULTI FOR HIM PO), Take 1 capsule by mouth daily., Disp: , Rfl:   •  psyllium (METAMUCIL) 58.6 % packet, Take 1 packet by mouth Daily., Disp: , Rfl:   •  SUDAFED 24 HOUR NON-DROWSY 240 MG tablet sustained-release 24 hour, Take 1 tablet by mouth Daily As Needed., Disp: , Rfl: 5  •  topiramate (TOPAMAX) 200 MG tablet, Take 1 tablet by mouth Every Night., Disp: 30 tablet, Rfl: 3  •  traZODone (DESYREL) 100 MG tablet, Take 1 tablet by mouth Every Night., Disp: 90 tablet, Rfl: 1  •  amoxicillin-clavulanate (AUGMENTIN) 875-125 MG per tablet, Take 1 tablet by mouth 2 (Two) Times a Day., Disp: 20 tablet, Rfl: 0  •  methylPREDNISolone (MEDROL, RAIN,) 4 MG tablet, follow package directions, Disp: 21 tablet, Rfl: 0      The following portions of the patient's history were reviewed and updated as appropriate: allergies, current medications, past family history, past medical history, past social history, past surgical history and problem list.    Review of Systems   Constitutional: Negative.  Negative for chills and fever.   HENT: Positive for congestion, nosebleeds,  "postnasal drip and sinus pressure. Negative for ear discharge, ear pain and sore throat.    Respiratory: Negative for cough, chest tightness and shortness of breath.    Cardiovascular: Negative for chest pain, palpitations and leg swelling.   Gastrointestinal: Negative for diarrhea, nausea and vomiting.   Musculoskeletal: Negative for arthralgias, back pain and myalgias.   Neurological: Negative for dizziness, syncope and headaches.   Psychiatric/Behavioral: Negative for confusion and sleep disturbance.       Objective   /82   Pulse 95   Ht 180.3 cm (71\")   Wt 77.8 kg (171 lb 9.6 oz)   SpO2 99% Comment: ra  BMI 23.93 kg/m²   Physical Exam   Constitutional: He is oriented to person, place, and time. He appears well-developed and well-nourished.   HENT:   Head: Normocephalic and atraumatic.   Right Ear: External ear normal. Tympanic membrane is bulging.   Left Ear: External ear normal. Tympanic membrane is bulging.   Mouth/Throat: Posterior oropharyngeal erythema present. No oropharyngeal exudate or tonsillar abscesses.   Neck: Normal range of motion. Neck supple. No thyromegaly present.   Cardiovascular: Normal rate and regular rhythm.   Pulmonary/Chest: Effort normal and breath sounds normal. No stridor. No respiratory distress. He has no wheezes.   Abdominal: Soft. Bowel sounds are normal.   Lymphadenopathy:     He has cervical adenopathy.   Neurological: He is alert and oriented to person, place, and time. No cranial nerve deficit.   Skin: Skin is warm and dry.   Acne face- mild.   Psychiatric: He has a normal mood and affect. Judgment normal.   Nursing note and vitals reviewed.        Results for orders placed or performed in visit on 05/09/19   CBC (No Diff)   Result Value Ref Range    WBC 8.65 3.40 - 10.80 10*3/mm3    RBC 4.77 4.14 - 5.80 10*6/mm3    Hemoglobin 14.1 13.0 - 17.7 g/dL    Hematocrit 44.2 37.5 - 51.0 %    MCV 92.7 79.0 - 97.0 fL    MCH 29.6 26.6 - 33.0 pg    MCHC 31.9 31.5 - 35.7 g/dL    " RDW 14.1 12.3 - 15.4 %    RDW-SD 48.1 37.0 - 54.0 fl    MPV 9.9 6.0 - 12.0 fL    Platelets 369 140 - 450 10*3/mm3   Comprehensive Metabolic Panel   Result Value Ref Range    Glucose 89 65 - 99 mg/dL    BUN 18 6 - 20 mg/dL    Creatinine 0.94 0.76 - 1.27 mg/dL    Sodium 140 136 - 145 mmol/L    Potassium 4.6 3.5 - 5.2 mmol/L    Chloride 105 98 - 107 mmol/L    CO2 25.2 22.0 - 29.0 mmol/L    Calcium 9.5 8.6 - 10.5 mg/dL    Total Protein 7.4 6.0 - 8.5 g/dL    Albumin 4.50 3.50 - 5.20 g/dL    ALT (SGPT) 21 1 - 41 U/L    AST (SGOT) 15 1 - 40 U/L    Alkaline Phosphatase 54 39 - 117 U/L    Total Bilirubin 0.4 0.2 - 1.2 mg/dL    eGFR Non African Amer 87 >60 mL/min/1.73    Globulin 2.9 gm/dL    A/G Ratio 1.6 g/dL    BUN/Creatinine Ratio 19.1 7.0 - 25.0    Anion Gap 9.8 mmol/L   Lipid Panel   Result Value Ref Range    Total Cholesterol 194 0 - 200 mg/dL    Triglycerides 141 0 - 150 mg/dL    HDL Cholesterol 42 40 - 60 mg/dL    LDL Cholesterol  124 (H) 0 - 100 mg/dL    VLDL Cholesterol 28.2 5 - 40 mg/dL    LDL/HDL Ratio 2.95    TB Skin Test   Result Value Ref Range    TB Skin Test Negative     Induration 0 0 - 10 mm    Injection Date & Time 5/7/19 @ 12.40     Read Date & Time 5/9/19 @ 13.46              Assessment/Plan   Diagnoses and all orders for this visit:    Acute recurrent maxillary sinusitis  -     amoxicillin-clavulanate (AUGMENTIN) 875-125 MG per tablet; Take 1 tablet by mouth 2 (Two) Times a Day.  -     methylPREDNISolone (MEDROL, RAIN,) 4 MG tablet; follow package directions    Subacute maxillary sinusitis  -     minocycline (MINOCIN,DYNACIN) 50 MG capsule; Take 2 capsules by mouth Daily.    increase minocin to 1 bid.             Return if symptoms worsen or fail to improve.

## 2019-08-27 ENCOUNTER — TELEPHONE (OUTPATIENT)
Dept: INTERNAL MEDICINE | Facility: CLINIC | Age: 44
End: 2019-08-27

## 2019-08-27 DIAGNOSIS — J01.01 ACUTE RECURRENT MAXILLARY SINUSITIS: ICD-10-CM

## 2019-08-27 RX ORDER — CEFDINIR 300 MG/1
300 CAPSULE ORAL 2 TIMES DAILY
Qty: 20 CAPSULE | Refills: 0 | Status: SHIPPED | OUTPATIENT
Start: 2019-08-27 | End: 2019-10-14

## 2019-08-27 RX ORDER — METHYLPREDNISOLONE 4 MG/1
TABLET ORAL
Qty: 21 TABLET | Refills: 0 | Status: SHIPPED | OUTPATIENT
Start: 2019-08-27 | End: 2019-10-14
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
## 2019-09-10 ENCOUNTER — OFFICE VISIT (OUTPATIENT)
Dept: NEUROLOGY | Facility: CLINIC | Age: 44
End: 2019-09-10

## 2019-09-10 ENCOUNTER — TELEPHONE (OUTPATIENT)
Dept: NEUROLOGY | Facility: CLINIC | Age: 44
End: 2019-09-10

## 2019-09-10 VITALS
WEIGHT: 171 LBS | DIASTOLIC BLOOD PRESSURE: 74 MMHG | BODY MASS INDEX: 23.94 KG/M2 | HEIGHT: 71 IN | SYSTOLIC BLOOD PRESSURE: 118 MMHG

## 2019-09-10 DIAGNOSIS — G43.719 INTRACTABLE CHRONIC MIGRAINE WITHOUT AURA AND WITHOUT STATUS MIGRAINOSUS: Primary | ICD-10-CM

## 2019-09-10 PROCEDURE — 99214 OFFICE O/P EST MOD 30 MIN: CPT | Performed by: PSYCHIATRY & NEUROLOGY

## 2019-09-10 RX ORDER — TOPIRAMATE 150 MG/1
300 CAPSULE, EXTENDED RELEASE ORAL DAILY
Qty: 60 EACH | Refills: 3 | Status: SHIPPED | OUTPATIENT
Start: 2019-09-10 | End: 2019-09-11

## 2019-09-10 RX ORDER — ELETRIPTAN HYDROBROMIDE 40 MG/1
40 TABLET, FILM COATED ORAL AS NEEDED
Qty: 9 TABLET | Refills: 3 | Status: SHIPPED | OUTPATIENT
Start: 2019-09-10 | End: 2019-12-17 | Stop reason: SDUPTHER

## 2019-09-10 RX ORDER — INDOMETHACIN 75 MG/1
75 CAPSULE, EXTENDED RELEASE ORAL AS NEEDED
Qty: 30 CAPSULE | Refills: 0 | Status: SHIPPED | OUTPATIENT
Start: 2019-09-10 | End: 2019-10-09 | Stop reason: SDUPTHER

## 2019-09-10 NOTE — TELEPHONE ENCOUNTER
----- Message from Liz Ny sent at 9/10/2019 12:43 PM EDT -----  Contact: Pt  Roberth    Pt called in regards to RX Topiramate  MG capsule extended-release 24 hour sprinkle not being covered by his insurance.  Needs prior approval please. Any questions please call @[phone].

## 2019-09-10 NOTE — TELEPHONE ENCOUNTER
Completed PA via cover my meds and this medication is excluded from patient's benefit plan. Please advise.
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
## 2019-09-11 ENCOUNTER — TELEPHONE (OUTPATIENT)
Dept: NEUROLOGY | Facility: CLINIC | Age: 44
End: 2019-09-11

## 2019-09-11 RX ORDER — TOPIRAMATE 150 MG/1
300 CAPSULE, EXTENDED RELEASE ORAL NIGHTLY
Qty: 60 EACH | Refills: 3 | Status: SHIPPED | OUTPATIENT
Start: 2019-09-11 | End: 2019-12-17

## 2019-09-11 NOTE — TELEPHONE ENCOUNTER
Spoke with Roxanne from [facility] and she would like to know if they prescribe Quedexy  MG BID because that is covered by insurance. Please advise.       Spoke with patient and he is okay with trying the generic form.
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
## 2019-09-11 NOTE — TELEPHONE ENCOUNTER
Can you let him know that is not approved by insurance and he can instead do Topamax immediate release 300 mg daily.  He will take one half of 200 mg to make it 300 mg total dose.

## 2019-09-11 NOTE — TELEPHONE ENCOUNTER
Left VM informing pt that Topirmate is not covered under insurance plan, so  would like pt to take the Topamax 200 MG that he has been taking and take 1 1/2 tablets to make 300 MG total. Advised pt to call if he needs refills.

## 2019-09-27 ENCOUNTER — PRIOR AUTHORIZATION (OUTPATIENT)
Dept: NEUROLOGY | Facility: CLINIC | Age: 44
End: 2019-09-27

## 2019-09-30 NOTE — TELEPHONE ENCOUNTER
Contacted pt to inform him that insurance had denied the Ajovy. Pt states he received that letter also, but he has 3 month supply. He states that his pharmacy is messing up something and not to worry right now. So I will find out what we need to do going forward to continue medication.

## 2019-09-30 NOTE — TELEPHONE ENCOUNTER
I have sent a prescription for Aimovig 140 mg subcutaneous injection.  He is due for his next Ajovy injection, instead of Ajovy, he will take Aimovig.  We can ask him to come and collect co-pay card for Aimovig.  You can let him know that unfortunately insurance has denied Ajovy.

## 2019-10-09 RX ORDER — INDOMETHACIN 75 MG/1
CAPSULE, EXTENDED RELEASE ORAL
Qty: 30 CAPSULE | Refills: 3 | Status: SHIPPED | OUTPATIENT
Start: 2019-10-09 | End: 2019-12-17

## 2019-10-14 ENCOUNTER — OFFICE VISIT (OUTPATIENT)
Dept: INTERNAL MEDICINE | Facility: CLINIC | Age: 44
End: 2019-10-14

## 2019-10-14 VITALS
WEIGHT: 170 LBS | OXYGEN SATURATION: 99 % | SYSTOLIC BLOOD PRESSURE: 106 MMHG | BODY MASS INDEX: 23.71 KG/M2 | TEMPERATURE: 98.6 F | HEART RATE: 92 BPM | DIASTOLIC BLOOD PRESSURE: 82 MMHG

## 2019-10-14 DIAGNOSIS — N20.0 NEPHROLITHIASIS: ICD-10-CM

## 2019-10-14 DIAGNOSIS — R68.89 FLU-LIKE SYMPTOMS: Primary | ICD-10-CM

## 2019-10-14 DIAGNOSIS — J32.9 RECURRENT SINUSITIS: ICD-10-CM

## 2019-10-14 LAB
EXPIRATION DATE: NORMAL
FLUAV AG NPH QL: NEGATIVE
FLUBV AG NPH QL: NEGATIVE
INTERNAL CONTROL: NORMAL
Lab: NORMAL

## 2019-10-14 PROCEDURE — 87804 INFLUENZA ASSAY W/OPTIC: CPT | Performed by: INTERNAL MEDICINE

## 2019-10-14 PROCEDURE — 99213 OFFICE O/P EST LOW 20 MIN: CPT | Performed by: INTERNAL MEDICINE

## 2019-10-14 RX ORDER — MOXIFLOXACIN HYDROCHLORIDE 400 MG/1
400 TABLET ORAL DAILY
Qty: 10 TABLET | Refills: 0 | Status: SHIPPED | OUTPATIENT
Start: 2019-10-14 | End: 2020-05-11

## 2019-10-14 NOTE — PROGRESS NOTES
Same Day (Flu SX)    Subjective   Carson Butler is a 43 y.o. male is here today for follow-up.    History of Present Illness   Carson is here with flu like symptoms. Had a cold x 2 weeks, then last 2 days, worse, with body aches, - chills. + fevrs on and off since Saturday.No rest. Working 60 hours / week.  He has been around sick contacts.  Got the flu shot last month.      Current Outpatient Medications:   •  Cholecalciferol (VITAMIN D) 2000 UNITS capsule, Take  by mouth., Disp: , Rfl:   •  eletriptan (RELPAX) 40 MG tablet, Take 1 tablet by mouth As Needed for Migraine., Disp: 9 tablet, Rfl: 3  •  Erenumab-aooe (AIMOVIG) 140 MG/ML solution auto-injector, Inject 1 mL under the skin into the appropriate area as directed Every 30 (Thirty) Days., Disp: 1.12 mL, Rfl: 11  •  indomethacin SR (INDOCIN SR) 75 MG CR capsule, TAKE ONE CAPSULE BY MOUTH AS NEEDED FOR MIGRAINE, Disp: 30 capsule, Rfl: 3  •  levocetirizine (XYZAL) 5 MG tablet, Take 5 mg by mouth every evening., Disp: , Rfl:   •  minocycline (MINOCIN,DYNACIN) 50 MG capsule, Take 2 capsules by mouth Daily., Disp: 180 capsule, Rfl: 1  •  Multiple Vitamins-Minerals (MULTI FOR HIM PO), Take 1 capsule by mouth daily., Disp: , Rfl:   •  psyllium (METAMUCIL) 58.6 % packet, Take 1 packet by mouth Daily., Disp: , Rfl:   •  SUDAFED 24 HOUR NON-DROWSY 240 MG tablet sustained-release 24 hour, Take 1 tablet by mouth Daily As Needed., Disp: , Rfl: 5  •  Topiramate ER (QUDEXY XR) 150 MG capsule extended-release 24 hour sprinkle, Take 300 mg by mouth Every Night., Disp: 60 each, Rfl: 3  •  traZODone (DESYREL) 100 MG tablet, Take 1 tablet by mouth Every Night., Disp: 90 tablet, Rfl: 1  •  Chlorcyclizine-Pseudoephed (STAHIST AD) 25-60 MG tablet, Take 1 tablet by mouth 2 (Two) Times a Day., Disp: 60 tablet, Rfl: 5  •  moxifloxacin (AVELOX) 400 MG tablet, Take 1 tablet by mouth Daily., Disp: 10 tablet, Rfl: 0      The following portions of the patient's history were reviewed and  updated as appropriate: allergies, current medications, past family history, past medical history, past social history, past surgical history and problem list.    Review of Systems   Constitutional: Negative.  Negative for chills and fever.   HENT: Positive for congestion, rhinorrhea and sinus pressure. Negative for ear discharge, ear pain and sore throat.    Respiratory: Positive for cough. Negative for chest tightness and shortness of breath.    Cardiovascular: Negative for chest pain, palpitations and leg swelling.   Gastrointestinal: Negative for diarrhea, nausea and vomiting.   Musculoskeletal: Negative for arthralgias, back pain and myalgias.   Neurological: Negative for dizziness, syncope and headaches.   Psychiatric/Behavioral: Negative for confusion and sleep disturbance.       Objective   /82   Pulse 92   Temp 98.6 °F (37 °C)   Wt 77.1 kg (170 lb)   SpO2 99%   BMI 23.71 kg/m²   Physical Exam   Constitutional: He is oriented to person, place, and time. He appears well-developed and well-nourished.   HENT:   Head: Normocephalic and atraumatic.   Right Ear: Tympanic membrane is bulging.   Left Ear: Tympanic membrane is bulging.   Mouth/Throat: Posterior oropharyngeal erythema present. No oropharyngeal exudate or tonsillar abscesses.   Eyes: Pupils are equal, round, and reactive to light.   Neck: Normal range of motion.   Cardiovascular: Normal rate and regular rhythm.   Pulmonary/Chest: Effort normal and breath sounds normal. No stridor.   Abdominal: Soft. Bowel sounds are normal.   Lymphadenopathy:     He has cervical adenopathy.   Neurological: He is alert and oriented to person, place, and time.   Skin: Skin is warm and dry.   Psychiatric: He has a normal mood and affect.         Results for orders placed or performed in visit on 10/14/19   POCT Influenza A/B   Result Value Ref Range    Rapid Influenza A Ag Negative Negative    Rapid Influenza B Ag Negative Negative    Internal Control Passed  Passed    Lot Number [id number]     Expiration Date 11-              Assessment/Plan   Diagnoses and all orders for this visit:    Flu-like symptoms  -     POCT Influenza A/B    Nephrolithiasis  -     Ambulatory Referral to Urology    Recurrent sinusitis  -     moxifloxacin (AVELOX) 400 MG tablet; Take 1 tablet by mouth Daily.  -     Chlorcyclizine-Pseudoephed (STAHIST AD) 25-60 MG tablet; Take 1 tablet by mouth 2 (Two) Times a Day.      S/p augmentin and omnicef + pred taper, sxs persisting with recent flare.  Start meds as above.  Cautioned against strenuous activity and counseled regarding the risk of tendinitis on the Avelox.           Return for Next scheduled follow up.

## 2019-11-03 DIAGNOSIS — J01.00 SUBACUTE MAXILLARY SINUSITIS: ICD-10-CM

## 2019-11-03 RX ORDER — MINOCYCLINE HYDROCHLORIDE 50 MG/1
50 CAPSULE ORAL DAILY
Qty: 90 CAPSULE | Refills: 0 | Status: SHIPPED | OUTPATIENT
Start: 2019-11-03 | End: 2020-03-21

## 2019-11-27 RX ORDER — FREMANEZUMAB-VFRM 225 MG/1.5ML
INJECTION SUBCUTANEOUS
Qty: 3 SYRINGE | Refills: 0 | Status: SHIPPED | OUTPATIENT
Start: 2019-11-27 | End: 2019-12-17

## 2019-11-27 NOTE — TELEPHONE ENCOUNTER
Pt pharmacy called in stating that pt has been taking Ajovy and we sent over a Aimovig Rx. Advised that is because Ajovy was denied by insurance. Advised I had verbalized this with pt and he told me not to worry about it because something got messed up at his pharmacy and he just picked up a 3 months supply. Informed pharmacy that I never heard anything back. pharmacist states pt co-pay card expires at the end of December. Advised that we can send in a Rx for Ajovy but once that co-pay expires pt may or may not be able to continue it due to coverage. Pharmacy states that we can cross that bridge when we get there. Pharmacist states she will send over refill request.

## 2019-12-02 ENCOUNTER — TELEPHONE (OUTPATIENT)
Dept: NEUROLOGY | Facility: CLINIC | Age: 44
End: 2019-12-02

## 2019-12-02 NOTE — TELEPHONE ENCOUNTER
Left  for pt to callback.    -I personally spoke with pt on  and informed him that insurance denied Ajovy and  was switching pt medication to Aimovig to see if it would be approved. Pt then informed me that he had also received a letter from insurance but not to worry about it because he was able to get a 3 months supply of Ajovy with co-pay card. Spoke with pharmacy last week and informed them of the same thing. Pharmacy informed me that after they dispense this last 3 months, patients co-pay card will . If pt would like to continue using co-pay card he can try enrolling again.

## 2019-12-02 NOTE — TELEPHONE ENCOUNTER
----- Message from Darling Boudreaux, [employer] Rep sent at 12/2/2019  9:00 AM EST -----  Contact: PT   Roberth     PT called/LVM in reference to his medication, states his prescription was changed without his knowledge. PT was prescribed Ajovy and it was changed to Aimovig without speaking to him first. PT would like to know why his medication was changed.    Please call Carson back:  [phone]

## 2019-12-02 NOTE — TELEPHONE ENCOUNTER
Pt called and I relayed msg. -I personally spoke with pt on  and informed him that insurance denied Ajovy and  was switching pt medication to Aimovig to see if it would be approved. Pt then informed me that he had also received a letter from insurance but not to worry about it because he was able to get a 3 months supply of Ajovy with co-pay card. Spoke with pharmacy last week and informed them of the same thing. Pharmacy informed me that after they dispense this last 3 months, patients co-pay card will . If pt would like to continue using co-pay card he can try enrolling again.  Pt asked how to enroll for the co-pay card. Pt can be reached at [phone].

## 2019-12-17 ENCOUNTER — OFFICE VISIT (OUTPATIENT)
Dept: NEUROLOGY | Facility: CLINIC | Age: 44
End: 2019-12-17

## 2019-12-17 VITALS
WEIGHT: 170 LBS | DIASTOLIC BLOOD PRESSURE: 72 MMHG | BODY MASS INDEX: 23.8 KG/M2 | SYSTOLIC BLOOD PRESSURE: 108 MMHG | HEIGHT: 71 IN

## 2019-12-17 DIAGNOSIS — G43.719 INTRACTABLE CHRONIC MIGRAINE WITHOUT AURA AND WITHOUT STATUS MIGRAINOSUS: Primary | ICD-10-CM

## 2019-12-17 PROCEDURE — 99214 OFFICE O/P EST MOD 30 MIN: CPT | Performed by: PSYCHIATRY & NEUROLOGY

## 2019-12-17 RX ORDER — DEXAMETHASONE 1 MG
1 TABLET ORAL 2 TIMES DAILY WITH MEALS
Qty: 4 TABLET | Refills: 0 | Status: SHIPPED | OUTPATIENT
Start: 2019-12-17 | End: 2019-12-19

## 2019-12-17 RX ORDER — ELETRIPTAN HYDROBROMIDE 40 MG/1
40 TABLET, FILM COATED ORAL AS NEEDED
Qty: 9 TABLET | Refills: 3 | Status: SHIPPED | OUTPATIENT
Start: 2019-12-17 | End: 2020-04-10

## 2019-12-17 RX ORDER — TOPIRAMATE 150 MG/1
300 CAPSULE, EXTENDED RELEASE ORAL NIGHTLY
Qty: 60 EACH | Refills: 3 | Status: SHIPPED | OUTPATIENT
Start: 2019-12-17 | End: 2019-12-30

## 2019-12-17 RX ORDER — BUTALBITAL, ACETAMINOPHEN AND CAFFEINE 50; 325; 40 MG/1; MG/1; MG/1
1 TABLET ORAL AS NEEDED
Qty: 15 TABLET | Refills: 1 | Status: SHIPPED | OUTPATIENT
Start: 2019-12-17 | End: 2020-01-16

## 2019-12-17 RX ORDER — DEXAMETHASONE 1 MG
0.5 TABLET ORAL 2 TIMES DAILY WITH MEALS
Qty: 2 TABLET | Refills: 0 | Status: SHIPPED | OUTPATIENT
Start: 2019-12-17 | End: 2019-12-19

## 2019-12-17 RX ORDER — DEXAMETHASONE 1.5 MG/1
1.5 TABLET ORAL 2 TIMES DAILY WITH MEALS
Qty: 4 TABLET | Refills: 0 | Status: SHIPPED | OUTPATIENT
Start: 2019-12-17 | End: 2019-12-19
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
## 2019-12-18 ENCOUNTER — TELEPHONE (OUTPATIENT)
Dept: NEUROLOGY | Facility: CLINIC | Age: 44
End: 2019-12-18

## 2019-12-30 ENCOUNTER — TELEPHONE (OUTPATIENT)
Dept: NEUROLOGY | Facility: CLINIC | Age: 44
End: 2019-12-30

## 2019-12-30 RX ORDER — TOPIRAMATE 150 MG/1
CAPSULE, EXTENDED RELEASE ORAL
Qty: 60 EACH | Refills: 2 | Status: SHIPPED | OUTPATIENT
Start: 2019-12-30 | End: 2020-04-20

## 2019-12-31 DIAGNOSIS — F51.01 PRIMARY INSOMNIA: ICD-10-CM

## 2019-12-31 RX ORDER — TRAZODONE HYDROCHLORIDE 100 MG/1
100 TABLET ORAL NIGHTLY
Qty: 90 TABLET | Refills: 1 | Status: SHIPPED | OUTPATIENT
Start: 2019-12-31 | End: 2020-06-19

## 2020-01-27 ENCOUNTER — TELEPHONE (OUTPATIENT)
Dept: INTERNAL MEDICINE | Facility: CLINIC | Age: 45
End: 2020-01-27

## 2020-01-27 NOTE — TELEPHONE ENCOUNTER
Patient called and stated that his pharmacy needs a new script for his refill of SUDAFED 24 HOUR NON-DROWSY 240 MG tablet sustained-release 24 hour.    Verified [facility] on [address].    Patient can be contacted at [phone].

## 2020-01-28 ENCOUNTER — OFFICE VISIT (OUTPATIENT)
Dept: NEUROLOGY | Facility: CLINIC | Age: 45
End: 2020-01-28

## 2020-01-28 VITALS — BODY MASS INDEX: 23.8 KG/M2 | WEIGHT: 170 LBS | HEIGHT: 71 IN

## 2020-01-28 DIAGNOSIS — G43.719 INTRACTABLE CHRONIC MIGRAINE WITHOUT AURA AND WITHOUT STATUS MIGRAINOSUS: Primary | ICD-10-CM

## 2020-01-28 PROCEDURE — 99214 OFFICE O/P EST MOD 30 MIN: CPT | Performed by: PSYCHIATRY & NEUROLOGY

## 2020-01-28 RX ORDER — BUTALBITAL, ACETAMINOPHEN AND CAFFEINE 50; 325; 40 MG/1; MG/1; MG/1
1 TABLET ORAL EVERY 4 HOURS PRN
COMMUNITY
End: 2020-01-28 | Stop reason: SDUPTHER

## 2020-01-28 RX ORDER — BUTALBITAL, ACETAMINOPHEN AND CAFFEINE 50; 325; 40 MG/1; MG/1; MG/1
1 TABLET ORAL EVERY 4 HOURS PRN
Qty: 20 TABLET | Refills: 0 | Status: SHIPPED | OUTPATIENT
Start: 2020-01-28 | End: 2020-03-27

## 2020-01-28 RX ORDER — UBROGEPANT 100 MG/1
100 TABLET ORAL AS NEEDED
Qty: 10 TABLET | Refills: 3 | Status: SHIPPED | OUTPATIENT
Start: 2020-01-28 | End: 2020-03-30
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
## 2020-03-18 ENCOUNTER — TELEPHONE (OUTPATIENT)
Dept: INTERNAL MEDICINE | Facility: CLINIC | Age: 45
End: 2020-03-18

## 2020-03-21 DIAGNOSIS — J01.00 SUBACUTE MAXILLARY SINUSITIS: ICD-10-CM

## 2020-03-21 RX ORDER — MINOCYCLINE HYDROCHLORIDE 50 MG/1
CAPSULE ORAL
Qty: 180 CAPSULE | Refills: 0 | Status: SHIPPED | OUTPATIENT
Start: 2020-03-21 | End: 2020-05-11 | Stop reason: SDUPTHER

## 2020-03-25 DIAGNOSIS — G43.719 INTRACTABLE CHRONIC MIGRAINE WITHOUT AURA AND WITHOUT STATUS MIGRAINOSUS: ICD-10-CM

## 2020-03-26 ENCOUNTER — TELEPHONE (OUTPATIENT)
Dept: NEUROLOGY | Facility: CLINIC | Age: 45
End: 2020-03-26

## 2020-03-26 NOTE — TELEPHONE ENCOUNTER
Patient has a prescription question pertaining to his injectable medication. He is requesting that  or his MA call him back when available.

## 2020-03-27 RX ORDER — BUTALBITAL, ACETAMINOPHEN AND CAFFEINE 50; 325; 40 MG/1; MG/1; MG/1
TABLET ORAL
Qty: 20 TABLET | Refills: 2 | Status: SHIPPED | OUTPATIENT
Start: 2020-03-27 | End: 2020-08-21

## 2020-03-27 NOTE — TELEPHONE ENCOUNTER
PATIENT STATES THE QUESTIONS AND CONCERNS FROM YESTERDAY ARE AS FOLLOWS:     Provider:   Caller: MACHO STEVENS  Relationship to Patient:SELF  Phone Number: [phone]    Reason for Call:   *PT STATES THE PRESCRIPTION UBRELVY IS ON BACK ORDER FOR TWO MONTHS , WHAT CAN BE DONE ?    *PATIENT STATES THE INJECTION AIMOVIG DOES NOT WORK AND WOULD LIKE TO KNOW WHAT THE NEXT STEP WOULD BE?    When was the patient last seen: 1-28-20

## 2020-03-27 NOTE — TELEPHONE ENCOUNTER
Left VM returning patient's call.     -when patient calls back, please get all information being inquired about

## 2020-03-30 ENCOUNTER — TELEPHONE (OUTPATIENT)
Dept: NEUROLOGY | Facility: CLINIC | Age: 45
End: 2020-03-30

## 2020-03-30 NOTE — TELEPHONE ENCOUNTER
He will benefit with addition of Botox treatment to Aimovig injection.  Ask him if he is interested and  we can start the approval process?  Thanks

## 2020-03-30 NOTE — TELEPHONE ENCOUNTER
Pt states that he would give the botox a try. Pt would also like to know if you will send a Rx for Ubrelvy 50 MG, to see if he can get those. If not, he is willing to try a different abortive therapy. Please advise

## 2020-03-30 NOTE — TELEPHONE ENCOUNTER
I have sent treatment of Ubrelvy to his pharmacy.  Can you please contact Jeanne to start the Botox authorization process?  Thanks

## 2020-04-03 ENCOUNTER — PRIOR AUTHORIZATION (OUTPATIENT)
Dept: NEUROLOGY | Facility: CLINIC | Age: 45
End: 2020-04-03

## 2020-04-07 ENCOUNTER — TELEMEDICINE (OUTPATIENT)
Dept: NEUROLOGY | Facility: CLINIC | Age: 45
End: 2020-04-07

## 2020-04-07 DIAGNOSIS — G43.719 INTRACTABLE CHRONIC MIGRAINE WITHOUT AURA AND WITHOUT STATUS MIGRAINOSUS: Primary | ICD-10-CM

## 2020-04-07 PROCEDURE — 99214 OFFICE O/P EST MOD 30 MIN: CPT | Performed by: PSYCHIATRY & NEUROLOGY
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
## 2020-04-10 RX ORDER — ELETRIPTAN HYDROBROMIDE 40 MG/1
TABLET, FILM COATED ORAL
Qty: 9 TABLET | Refills: 3 | Status: SHIPPED | OUTPATIENT
Start: 2020-04-10 | End: 2020-05-01

## 2020-04-20 RX ORDER — TOPIRAMATE 150 MG/1
CAPSULE, EXTENDED RELEASE ORAL
Qty: 60 EACH | Refills: 3 | Status: SHIPPED | OUTPATIENT
Start: 2020-04-20 | End: 2020-08-18

## 2020-04-30 ENCOUNTER — TELEPHONE (OUTPATIENT)
Dept: NEUROLOGY | Facility: CLINIC | Age: 45
End: 2020-04-30

## 2020-05-01 ENCOUNTER — TELEPHONE (OUTPATIENT)
Dept: NEUROLOGY | Facility: CLINIC | Age: 45
End: 2020-05-01

## 2020-05-01 RX ORDER — SUMATRIPTAN AND NAPROXEN SODIUM 85; 500 MG/1; MG/1
TABLET, FILM COATED ORAL
Qty: 10 TABLET | Refills: 3 | Status: SHIPPED | OUTPATIENT
Start: 2020-05-01 | End: 2020-08-10

## 2020-05-01 NOTE — TELEPHONE ENCOUNTER
Ubrelvy 50 mg denied per Mei with [employer] appeals.   Medication not covered under insurance.   Please advise on new medication to be prescribed.

## 2020-05-01 NOTE — TELEPHONE ENCOUNTER
Okay.  I am sending a prescription of Treximet tablets, he will take 1 tablet at the onset of migraine.  No more than 2 times in 24 hours and no more than twice in a week.

## 2020-05-04 NOTE — TELEPHONE ENCOUNTER
Is in any way to appeal this as he has tried several triptans in the past and none of them have worked.  Ubrelvy does help him as an abortive treatment.

## 2020-05-08 ENCOUNTER — OFFICE VISIT (OUTPATIENT)
Dept: INTERNAL MEDICINE | Facility: CLINIC | Age: 45
End: 2020-05-08

## 2020-05-08 VITALS
SYSTOLIC BLOOD PRESSURE: 136 MMHG | BODY MASS INDEX: 23.1 KG/M2 | TEMPERATURE: 97.1 F | HEIGHT: 71 IN | WEIGHT: 165 LBS | DIASTOLIC BLOOD PRESSURE: 84 MMHG

## 2020-05-08 DIAGNOSIS — Z11.1 ENCOUNTER FOR SCREENING FOR RESPIRATORY TUBERCULOSIS: Primary | ICD-10-CM

## 2020-05-08 LAB
INDURATION: 0 MM (ref 0–10)
Lab: NORMAL
TB SKIN TEST: NEGATIVE

## 2020-05-08 PROCEDURE — 86580 TB INTRADERMAL TEST: CPT | Performed by: NURSE PRACTITIONER
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
## 2020-05-11 ENCOUNTER — APPOINTMENT (OUTPATIENT)
Dept: LAB | Facility: HOSPITAL | Age: 45
End: 2020-05-11

## 2020-05-11 ENCOUNTER — OFFICE VISIT (OUTPATIENT)
Dept: INTERNAL MEDICINE | Facility: CLINIC | Age: 45
End: 2020-05-11

## 2020-05-11 VITALS
HEIGHT: 71 IN | DIASTOLIC BLOOD PRESSURE: 80 MMHG | WEIGHT: 169.2 LBS | BODY MASS INDEX: 23.69 KG/M2 | SYSTOLIC BLOOD PRESSURE: 112 MMHG | HEART RATE: 111 BPM | OXYGEN SATURATION: 100 %

## 2020-05-11 DIAGNOSIS — Z12.5 PROSTATE CANCER SCREENING: ICD-10-CM

## 2020-05-11 DIAGNOSIS — M54.42 CHRONIC LEFT-SIDED LOW BACK PAIN WITH LEFT-SIDED SCIATICA: ICD-10-CM

## 2020-05-11 DIAGNOSIS — M54.32 SCIATIC PAIN, LEFT: ICD-10-CM

## 2020-05-11 DIAGNOSIS — J01.00 SUBACUTE MAXILLARY SINUSITIS: ICD-10-CM

## 2020-05-11 DIAGNOSIS — R73.01 IFG (IMPAIRED FASTING GLUCOSE): ICD-10-CM

## 2020-05-11 DIAGNOSIS — Z00.00 ANNUAL PHYSICAL EXAM: Primary | ICD-10-CM

## 2020-05-11 DIAGNOSIS — G89.29 CHRONIC LEFT-SIDED LOW BACK PAIN WITH LEFT-SIDED SCIATICA: ICD-10-CM

## 2020-05-11 LAB
25(OH)D3 SERPL-MCNC: 56.1 NG/ML (ref 30–100)
ALBUMIN SERPL-MCNC: 4.2 G/DL (ref 3.5–5.2)
ALBUMIN/GLOB SERPL: 1.5 G/DL
ALP SERPL-CCNC: 54 U/L (ref 39–117)
ALT SERPL W P-5'-P-CCNC: 23 U/L (ref 1–41)
ANION GAP SERPL CALCULATED.3IONS-SCNC: 9.8 MMOL/L (ref 5–15)
AST SERPL-CCNC: 21 U/L (ref 1–40)
BILIRUB BLD-MCNC: NEGATIVE MG/DL
BILIRUB SERPL-MCNC: 0.3 MG/DL (ref 0.2–1.2)
BUN BLD-MCNC: 19 MG/DL (ref 6–20)
BUN/CREAT SERPL: 19 (ref 7–25)
CALCIUM SPEC-SCNC: 9.9 MG/DL (ref 8.6–10.5)
CHLORIDE SERPL-SCNC: 104 MMOL/L (ref 98–107)
CHOLEST SERPL-MCNC: 172 MG/DL (ref 0–200)
CLARITY, POC: CLEAR
CO2 SERPL-SCNC: 26.2 MMOL/L (ref 22–29)
COLOR UR: YELLOW
CREAT BLD-MCNC: 1 MG/DL (ref 0.76–1.27)
DEPRECATED RDW RBC AUTO: 43.2 FL (ref 37–54)
ERYTHROCYTE [DISTWIDTH] IN BLOOD BY AUTOMATED COUNT: 13.1 % (ref 12.3–15.4)
GFR SERPL CREATININE-BSD FRML MDRD: 80 ML/MIN/1.73
GLOBULIN UR ELPH-MCNC: 2.8 GM/DL
GLUCOSE BLD-MCNC: 82 MG/DL (ref 65–99)
GLUCOSE UR STRIP-MCNC: NEGATIVE MG/DL
HBA1C MFR BLD: 5.62 % (ref 4.8–5.6)
HCT VFR BLD AUTO: 41.9 % (ref 37.5–51)
HDLC SERPL-MCNC: 40 MG/DL (ref 40–60)
HGB BLD-MCNC: 14.2 G/DL (ref 13–17.7)
KETONES UR QL: NEGATIVE
LDLC SERPL CALC-MCNC: 91 MG/DL (ref 0–100)
LDLC/HDLC SERPL: 2.27 {RATIO}
LEUKOCYTE EST, POC: NEGATIVE
MCH RBC QN AUTO: 30.5 PG (ref 26.6–33)
MCHC RBC AUTO-ENTMCNC: 33.9 G/DL (ref 31.5–35.7)
MCV RBC AUTO: 90.1 FL (ref 79–97)
NITRITE UR-MCNC: NEGATIVE MG/ML
PH UR: 7 [PH] (ref 5–8)
PLATELET # BLD AUTO: 379 10*3/MM3 (ref 140–450)
PMV BLD AUTO: 9.9 FL (ref 6–12)
POTASSIUM BLD-SCNC: 4.5 MMOL/L (ref 3.5–5.2)
PROT SERPL-MCNC: 7 G/DL (ref 6–8.5)
PROT UR STRIP-MCNC: NEGATIVE MG/DL
PSA SERPL-MCNC: 0.52 NG/ML (ref 0–4)
RBC # BLD AUTO: 4.65 10*6/MM3 (ref 4.14–5.8)
RBC # UR STRIP: NEGATIVE /UL
SODIUM BLD-SCNC: 140 MMOL/L (ref 136–145)
SP GR UR: 1.02 (ref 1–1.03)
TRIGL SERPL-MCNC: 207 MG/DL (ref 0–150)
TSH SERPL DL<=0.05 MIU/L-ACNC: 0.46 UIU/ML (ref 0.27–4.2)
UROBILINOGEN UR QL: NORMAL
VLDLC SERPL-MCNC: 41.4 MG/DL (ref 5–40)
WBC NRBC COR # BLD: 6.43 10*3/MM3 (ref 3.4–10.8)

## 2020-05-11 PROCEDURE — 80053 COMPREHEN METABOLIC PANEL: CPT | Performed by: INTERNAL MEDICINE

## 2020-05-11 PROCEDURE — 85027 COMPLETE CBC AUTOMATED: CPT | Performed by: INTERNAL MEDICINE

## 2020-05-11 PROCEDURE — G0103 PSA SCREENING: HCPCS | Performed by: INTERNAL MEDICINE

## 2020-05-11 PROCEDURE — 80061 LIPID PANEL: CPT | Performed by: INTERNAL MEDICINE

## 2020-05-11 PROCEDURE — 82306 VITAMIN D 25 HYDROXY: CPT | Performed by: INTERNAL MEDICINE

## 2020-05-11 PROCEDURE — 81003 URINALYSIS AUTO W/O SCOPE: CPT | Performed by: INTERNAL MEDICINE

## 2020-05-11 PROCEDURE — 83036 HEMOGLOBIN GLYCOSYLATED A1C: CPT | Performed by: INTERNAL MEDICINE

## 2020-05-11 PROCEDURE — 86803 HEPATITIS C AB TEST: CPT | Performed by: INTERNAL MEDICINE

## 2020-05-11 PROCEDURE — 84443 ASSAY THYROID STIM HORMONE: CPT | Performed by: INTERNAL MEDICINE

## 2020-05-11 PROCEDURE — 99396 PREV VISIT EST AGE 40-64: CPT | Performed by: INTERNAL MEDICINE

## 2020-05-11 RX ORDER — PREDNISONE 10 MG/1
TABLET ORAL
Qty: 18 TABLET | Refills: 0 | Status: SHIPPED | OUTPATIENT
Start: 2020-05-11 | End: 2020-09-30

## 2020-05-11 RX ORDER — MINOCYCLINE HYDROCHLORIDE 50 MG/1
100 CAPSULE ORAL DAILY
Qty: 180 CAPSULE | Refills: 1 | Status: SHIPPED | OUTPATIENT
Start: 2020-05-11 | End: 2020-11-16

## 2020-05-11 RX ORDER — METAXALONE 800 MG/1
800 TABLET ORAL 3 TIMES DAILY PRN
Qty: 30 TABLET | Refills: 3 | Status: SHIPPED | OUTPATIENT
Start: 2020-05-11 | End: 2020-10-28

## 2020-05-11 NOTE — PROGRESS NOTES
Chief Complaint   Patient presents with   • Annual Exam       History of Present Illness  HM, Adult Male: The patient is being seen for a health maintenance evaluation. The last health maintenance visit was 1 year(s) ago.   Social History: Household members include spouse. He is , with 1  Daughter-3 yo.  Work status: working full time as [employer], visits NH. The patient has never smoked cigarettes. He reports never drinking alcohol. He has never used illicit drugs.   General Health: The patient's health is described as good. He has regular dental visits. He denies vision problems. He denies hearing loss. Immunizations status: up to date.   Lifestyle: He consumes a diverse and healthy diet. He does not have any weight concerns. He exercises regularly. He does not use tobacco. He does report to occasional alcohol use. He denies drug use.   Screening: Cancer screening reviewed and current.   Metabolic screening reviewed and current.   Risk screening reviewed and current.     Having left sided sciatica again, mostly when sitting. Has had a CT scan of his lumbar spine in the past, was referred to Dr. Pimentel.  Has not tried PT.  Reports he has had to do procedures on Covid +ve patients. Has been using PPE and additional protective layers.      Review of Systems   Constitutional: Negative for chills and fatigue.   HENT: Positive for postnasal drip. Negative for congestion, ear pain and sore throat.    Eyes: Negative for pain, redness and visual disturbance.   Respiratory: Negative for cough and shortness of breath.    Cardiovascular: Negative for chest pain, palpitations and leg swelling.   Gastrointestinal: Negative for abdominal pain, diarrhea and nausea.   Endocrine: Negative for cold intolerance and heat intolerance.   Genitourinary: Negative for flank pain and urgency.   Musculoskeletal: Positive for arthralgias and back pain. Negative for gait problem.   Skin: Negative for pallor and rash.    Neurological: Negative for dizziness, weakness and headaches.   Psychiatric/Behavioral: Negative for dysphoric mood and sleep disturbance. The patient is not nervous/anxious.        Patient Active Problem List   Diagnosis   • Dyslipidemia   • Thyroid nodule   • Vitamin D deficiency   • Left-sided low back pain with left-sided sciatica   • Non-healing skin lesion   • H/O splenectomy   • Intractable migraine without aura and with status migrainosus   • Influenza A       Social History     Socioeconomic History   • Marital status:      Spouse name: Not on file   • Number of children: Not on file   • Years of education: Not on file   • Highest education level: Not on file   Tobacco Use   • Smoking status: Former Smoker     Packs/day: 0.50     Years: 15.00     Pack years: 7.50     Types: Cigarettes     Start date: 1992     Last attempt to quit: 2009     Years since quittin.0   • Smokeless tobacco: Former User   • Tobacco comment: No longer a smoker. Have not smoked in 9 years.   Substance and Sexual Activity   • Alcohol use: Yes     Alcohol/week: 4.0 standard drinks     Types: 4 Cans of beer per week     Comment: Casual drinker. Not 4 beers every week.   • Drug use: No   • Sexual activity: Yes     Partners: Female     Birth control/protection: Surgical     Comment: Wife had Hysterectomy       Current Outpatient Medications on File Prior to Visit   Medication Sig Dispense Refill   • butalbital-acetaminophen-caffeine (FIORICET, ESGIC) -40 MG per tablet TAKE 1 TABLET BY MOUTH EVERY 4 HOURS AS NEEDED FOR HEADACHE 20 tablet 2   • Cholecalciferol (VITAMIN D) 2000 UNITS capsule Take  by mouth.     • OnabotulinumtoxinA 200 units reconstituted solution Inject 200 units IM into head neck shoulder every 12 weeks per FDA protocol Dx G43.719 1 each 3   • psyllium (METAMUCIL) 58.6 % packet Take 1 packet by mouth Daily.     • QUDEXY  MG capsule extended-release 24 hour sprinkle TAKE 2 CAPSULES BY  "MOUTH DAILY 60 each 3   • SUMAtriptan-naproxen (Treximet)  MG per tablet Take one tablet at onset of headache. May repeat dose one time in 2 hours if headache not relieved. 10 tablet 3   • traZODone (DESYREL) 100 MG tablet TAKE 1 TABLET BY MOUTH EVERY NIGHT 90 tablet 1   • [DISCONTINUED] Chlorcyclizine-Pseudoephed (STAHIST AD) 25-60 MG tablet Take 1 tablet by mouth 2 (Two) Times a Day. 60 tablet 5   • [DISCONTINUED] levocetirizine (XYZAL) 5 MG tablet Take 5 mg by mouth every evening.     • [DISCONTINUED] minocycline (MINOCIN,DYNACIN) 50 MG capsule TAKE 2 CAPSULES BY MOUTH DAILY 180 capsule 0   • [DISCONTINUED] moxifloxacin (AVELOX) 400 MG tablet Take 1 tablet by mouth Daily. 10 tablet 0   • [DISCONTINUED] Pseudoephedrine HCl ER (SUDAFED 24 HOUR NON-DROWSY) 240 MG tablet sustained-release 24 hour Take 1 tablet by mouth Daily As Needed (allergies). 90 each 1     No current facility-administered medications on file prior to visit.        Allergies   Allergen Reactions   • Other Itching     Seasonal...grass, pollen       /80   Pulse 111   Ht 180.3 cm (71\")   Wt 76.7 kg (169 lb 3.2 oz)   SpO2 100% Comment: ra  BMI 23.60 kg/m²          The following portions of the patient's history were reviewed and updated as appropriate: allergies, current medications, past family history, past medical history, past social history, past surgical history and problem list.    Physical Exam   Constitutional: He is oriented to person, place, and time. He appears well-developed and well-nourished.   HENT:   Head: Normocephalic and atraumatic.   Right Ear: External ear normal.   Left Ear: External ear normal.   Mouth/Throat: No oropharyngeal exudate.   Eyes: Pupils are equal, round, and reactive to light. Conjunctivae are normal.   Neck: Neck supple. No thyromegaly present.   Cardiovascular: Normal rate and regular rhythm. Exam reveals no friction rub.   No murmur heard.  Pulmonary/Chest: Effort normal and breath sounds " normal. He has no wheezes. He has no rales.   Abdominal: Soft. Bowel sounds are normal. He exhibits no distension and no mass. There is no tenderness. There is no rebound.   Musculoskeletal: He exhibits tenderness. He exhibits no edema.   Lymphadenopathy:     He has no cervical adenopathy.   Neurological: He is alert and oriented to person, place, and time. He displays normal reflexes. No cranial nerve deficit or sensory deficit. He exhibits normal muscle tone. Coordination normal.   Skin: Skin is warm and dry.   Psychiatric: He has a normal mood and affect. His behavior is normal. Judgment and thought content normal.   Nursing note and vitals reviewed.      Results for orders placed or performed in visit on 05/11/20   CBC (No Diff)   Result Value Ref Range    WBC 6.43 3.40 - 10.80 10*3/mm3    RBC 4.65 4.14 - 5.80 10*6/mm3    Hemoglobin 14.2 13.0 - 17.7 g/dL    Hematocrit 41.9 37.5 - 51.0 %    MCV 90.1 79.0 - 97.0 fL    MCH 30.5 26.6 - 33.0 pg    MCHC 33.9 31.5 - 35.7 g/dL    RDW 13.1 12.3 - 15.4 %    RDW-SD 43.2 37.0 - 54.0 fl    MPV 9.9 6.0 - 12.0 fL    Platelets 379 140 - 450 10*3/mm3   POCT urinalysis dipstick, automated   Result Value Ref Range    Color Yellow Yellow, Straw, Dark Yellow, Julia    Clarity, UA Clear Clear    Specific Gravity  1.020 1.005 - 1.030    pH, Urine 7.0 5.0 - 8.0    Leukocytes Negative Negative    Nitrite, UA Negative Negative    Protein, POC Negative Negative mg/dL    Glucose, UA Negative Negative, 1000 mg/dL (3+) mg/dL    Ketones, UA Negative Negative    Urobilinogen, UA Normal Normal    Bilirubin Negative Negative    Blood, UA Negative Negative       PPD placed 11/8/2020, Read 11/11/2020  Negative. 0 mm induration.    Carson was seen today for annual exam.    Diagnoses and all orders for this visit:    Annual physical exam  -     CBC (No Diff)  -     Comprehensive Metabolic Panel  -     Lipid Panel  -     TSH  -     Vitamin D 25 Hydroxy  -     PSA Screen  -     Hemoglobin A1c  -      POCT urinalysis dipstick, automated  -     Hepatitis C Antibody    Subacute maxillary sinusitis  -     minocycline (MINOCIN,DYNACIN) 50 MG capsule; Take 2 capsules by mouth Daily.  -     Pseudoephedrine HCl ER (SUDAFED 24 HOUR NON-DROWSY) 240 MG tablet sustained-release 24 hour; Take 1 tablet by mouth Daily As Needed (allergies).    Sciatic pain, left  -     predniSONE (DELTASONE) 10 MG tablet; Take 3 tabs daily x 3 days then 2 tabs daily x 3 days then 1 tab daily x 3 days then stop.  -     metaxalone (SKELAXIN) 800 MG tablet; Take 1 tablet by mouth 3 (Three) Times a Day As Needed for Muscle Spasms.  -     Ambulatory Referral to Physical Therapy Evaluate and treat    Prostate cancer screening  -     PSA Screen    Chronic left-sided low back pain with left-sided sciatica  Comments:  Baclofen if skelaxin not covered.    IFG (impaired fasting glucose)  -     Hemoglobin A1c        Health Maintenance   Topic Date Due   • HEPATITIS C SCREENING  04/29/2016   • INFLUENZA VACCINE  08/01/2020   • ANNUAL PHYSICAL  05/12/2021   • TDAP/TD VACCINES (2 - Td) 04/26/2023       Discussion/Summary  Impression: health maintenance visit, healthy adult male.   Currently, he eats a healthy diet and has an adequate exercise regimen.   Prostate cancer screening: PSA was ordered.   Testicular cancer screening: monthly self testicular exam was advised.   Colorectal cancer screening: fecal occult blood testing is needed every year and colonoscopy is due at 50.   Screening lab work includes glucose, lipid profile, Hep C screen and 25-hydroxyvitamin D.   The immunizations are up to date.   Advice and education were given regarding cardiovascular risk reduction, healthy dietary habits, Seatbelt and helmet use and self skin examination.        Return in about 6 months (around 11/11/2020) for Next scheduled follow up.

## 2020-05-12 LAB — HCV AB SER DONR QL: NORMAL

## 2020-05-18 ENCOUNTER — TREATMENT (OUTPATIENT)
Dept: PHYSICAL THERAPY | Facility: CLINIC | Age: 45
End: 2020-05-18

## 2020-05-18 ENCOUNTER — TELEPHONE (OUTPATIENT)
Dept: INTERNAL MEDICINE | Facility: CLINIC | Age: 45
End: 2020-05-18

## 2020-05-18 DIAGNOSIS — M54.42 LEFT-SIDED LOW BACK PAIN WITH LEFT-SIDED SCIATICA, UNSPECIFIED CHRONICITY: Primary | ICD-10-CM

## 2020-05-18 PROCEDURE — 97162 PT EVAL MOD COMPLEX 30 MIN: CPT | Performed by: PHYSICAL THERAPIST

## 2020-05-18 PROCEDURE — 97140 MANUAL THERAPY 1/> REGIONS: CPT | Performed by: PHYSICAL THERAPIST

## 2020-05-18 PROCEDURE — 97110 THERAPEUTIC EXERCISES: CPT | Performed by: PHYSICAL THERAPIST

## 2020-05-18 NOTE — TELEPHONE ENCOUNTER
Pt states that be is still having sciatica and steriods will be gone in 2 day, skelaxin is not helping.  Is going to PT, but only willing to go once a week due to cost, however will do home PT along with it.  Pt wanting to know if other medication can be sent in for him?  Please advise.

## 2020-05-18 NOTE — TELEPHONE ENCOUNTER
pls let him know- Sent in rx for diclofenac twice daily. Should take with food and continue PT and home exercises.    thanks
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
## 2020-05-27 ENCOUNTER — TREATMENT (OUTPATIENT)
Dept: PHYSICAL THERAPY | Facility: CLINIC | Age: 45
End: 2020-05-27

## 2020-05-27 DIAGNOSIS — M54.42 LEFT-SIDED LOW BACK PAIN WITH LEFT-SIDED SCIATICA, UNSPECIFIED CHRONICITY: Primary | ICD-10-CM

## 2020-05-27 PROCEDURE — 97530 THERAPEUTIC ACTIVITIES: CPT | Performed by: PHYSICAL THERAPIST

## 2020-05-27 PROCEDURE — 97112 NEUROMUSCULAR REEDUCATION: CPT | Performed by: PHYSICAL THERAPIST

## 2020-05-27 PROCEDURE — 97140 MANUAL THERAPY 1/> REGIONS: CPT | Performed by: PHYSICAL THERAPIST

## 2020-05-27 NOTE — PROGRESS NOTES
Physical Therapy Daily Progress Note    Subjective   Carson Butler reports: he had no pain for the whole day after his first visit. The pain returned when he went to work the next day. He tried to put a rolled towel behind his back in the car but it did not help his symptoms.      Objective   See Exercise, Manual, and Modality Logs for complete treatment.       Assessment/Plan  Pt tolerated treatment well. He demonstrates poor core stability with bird dogs and required tactile cues to not rotate his hips. Pt was instructed on a new HEP and encouraged to try a Darian roll in the car. Pt would benefit from continued skilled PT.    Progress per Plan of Care           Manual Therapy:    15     mins  01110;  Therapeutic Exercise:         mins  26267;     Neuromuscular Yvette:    25    mins  44507;    Therapeutic Activity:     10     mins  24332;     Gait Training:           mins  97815;     Ultrasound:          mins  14889;    Electrical Stimulation:         mins  23014 ( );  E-Stim Attended:         mins  99263  Iontophoresis          mins 85779   Traction          mins  69589  Fluidotherapy          mins  04751  Dry Needling          mins self-pay - No Charge  Paraffin          mins  51900    Timed Treatment:   55   mins   Total Treatment:     55   mins    Raisa Frausto, PT, DPT  Physical Therapist

## 2020-06-10 ENCOUNTER — TREATMENT (OUTPATIENT)
Dept: PHYSICAL THERAPY | Facility: CLINIC | Age: 45
End: 2020-06-10

## 2020-06-10 DIAGNOSIS — M54.42 LEFT-SIDED LOW BACK PAIN WITH LEFT-SIDED SCIATICA, UNSPECIFIED CHRONICITY: Primary | ICD-10-CM

## 2020-06-10 PROCEDURE — 97112 NEUROMUSCULAR REEDUCATION: CPT | Performed by: PHYSICAL THERAPIST

## 2020-06-10 PROCEDURE — 97140 MANUAL THERAPY 1/> REGIONS: CPT | Performed by: PHYSICAL THERAPIST

## 2020-06-10 PROCEDURE — 97110 THERAPEUTIC EXERCISES: CPT | Performed by: PHYSICAL THERAPIST

## 2020-06-10 NOTE — PROGRESS NOTES
Physical Therapy Daily Progress Note    Subjective   Carson Butler reports: that he felt pretty good over the last two weeks. 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 He only had 3 episodes of pain in the last 2 weeks that got better when he did seated pelvic tilts.    Objective   See Exercise, Manual, and Modality Logs for complete treatment.       Assessment/Plan  Pt tolerated treatment well. He required cues to keep his low back pressed down with dead bugs in order to better activate his core and not cause any back pain. Pt was instructed on the importance of completing his HEP. Pt would benefit from continued skilled PT.    Progress per Plan of Care           Manual Therapy:    15     mins  49177;  Therapeutic Exercise:    10     mins  71803;     Neuromuscular Yvette:    25    mins  84733;    Therapeutic Activity:         mins  16255;     Gait Training:           mins  15569;     Ultrasound:          mins  89009;    Electrical Stimulation:         mins  53158 ( );  E-Stim Attended:         mins  13273  Iontophoresis          mins 87570   Traction          mins  33891  Fluidotherapy          mins  63947  Dry Needling          mins self-pay - No Charge  Paraffin          mins  86362    Timed Treatment:   50   mins   Total Treatment:     50   mins    Raisa Frausto, PT, DPT  Physical Therapist

## 2020-06-19 DIAGNOSIS — F51.01 PRIMARY INSOMNIA: ICD-10-CM

## 2020-06-19 RX ORDER — TRAZODONE HYDROCHLORIDE 100 MG/1
100 TABLET ORAL NIGHTLY
Qty: 90 TABLET | Refills: 1 | Status: SHIPPED | OUTPATIENT
Start: 2020-06-19 | End: 2020-12-28 | Stop reason: SDUPTHER

## 2020-07-01 ENCOUNTER — PROCEDURE VISIT (OUTPATIENT)
Dept: NEUROLOGY | Facility: CLINIC | Age: 45
End: 2020-07-01

## 2020-07-01 VITALS — HEIGHT: 71 IN | BODY MASS INDEX: 23.66 KG/M2 | WEIGHT: 169 LBS | TEMPERATURE: 97.3 F

## 2020-07-01 DIAGNOSIS — G43.719 INTRACTABLE CHRONIC MIGRAINE WITHOUT AURA AND WITHOUT STATUS MIGRAINOSUS: Primary | ICD-10-CM

## 2020-07-01 PROCEDURE — 64615 CHEMODENERV MUSC MIGRAINE: CPT | Performed by: PSYCHIATRY & NEUROLOGY

## 2020-07-01 NOTE — PROGRESS NOTES
CC:  Migraines.    HPI : Patient is in clinic for 1st Botox injection.  Current headache frequency is approximately 15-20  headache days in a month with some headaches lasting for more than 4 hours.      Botox Procedure Note    Current headache frequency: 15-20__ headaches days / month.    The risks and benefits were discussed with the patient. The patient was given the opportunity to ask questions. Informed consent form was signed.    Onabotulinumtoxin A was reconstituted with 0.9% normal saline. All injections sites were prepped with alcohol swab. Approximately 5 units of botox were injected into each of the following sites bilaterally as per routine migraine botox injection PREEMPT protocol: , procerus, frontalis, temporalis, occipitalis, upper cervical paraspinals, and trapezius.    The patient tolerated the procedure well. There were no immediate complications. The patient will follow up in approximately 12 weeks for her next injection.    Total amount of onabotulinumtoxin A injected was 155 units with 45 units wasted.    Additional notes: Continue with Aimovig 140 mg subcutaneous monthly injection along with Botox.  I have advised him to call office with response in about 6 weeks from a today's visit and I will see him back in clinic for second Botox injection in 12 weeks.  A prescription of Nurtec was sent to his pharmacy as Ubrelvy was not approved.

## 2020-07-15 ENCOUNTER — TELEPHONE (OUTPATIENT)
Dept: NEUROLOGY | Facility: CLINIC | Age: 45
End: 2020-07-15

## 2020-07-15 NOTE — TELEPHONE ENCOUNTER
Provider: DR. MALHOTRA  Caller: VALENTINE EDGE  Phone Number: [phone]    Patient: MACHO STEVENS  : 1975      Reason for Call:VALENTINE WITH [facility] ASKING IF THE PT IS STILL TAKING THE UBRELVY. PT GOT NURTEC ORDERED AND SHE WANTS TO KNOW IF IT IS REPLACING THE UBRELVY? PLEASE CALL HER BACK -[phone]

## 2020-08-10 RX ORDER — SUMATRIPTAN AND NAPROXEN SODIUM 85; 500 MG/1; MG/1
TABLET, FILM COATED ORAL
Qty: 10 TABLET | Refills: 3 | Status: SHIPPED | OUTPATIENT
Start: 2020-08-10 | End: 2020-12-30

## 2020-08-18 RX ORDER — TOPIRAMATE 150 MG/1
CAPSULE, EXTENDED RELEASE ORAL
Qty: 60 EACH | Refills: 2 | Status: SHIPPED | OUTPATIENT
Start: 2020-08-18 | End: 2020-09-21

## 2020-08-21 DIAGNOSIS — G43.719 INTRACTABLE CHRONIC MIGRAINE WITHOUT AURA AND WITHOUT STATUS MIGRAINOSUS: ICD-10-CM

## 2020-08-21 RX ORDER — BUTALBITAL, ACETAMINOPHEN AND CAFFEINE 50; 325; 40 MG/1; MG/1; MG/1
TABLET ORAL
Qty: 20 TABLET | Status: CANCELLED | OUTPATIENT
Start: 2020-08-21

## 2020-08-21 RX ORDER — BUTALBITAL, ACETAMINOPHEN AND CAFFEINE 50; 325; 40 MG/1; MG/1; MG/1
TABLET ORAL
Qty: 20 TABLET | Refills: 3 | Status: SHIPPED | OUTPATIENT
Start: 2020-08-21 | End: 2020-08-24 | Stop reason: SDUPTHER

## 2020-08-21 NOTE — TELEPHONE ENCOUNTER
PT CALLED BACK TO CHECK STATUS OF REFILL REQUEST. INFORMED PT IT WAS PENDING APPROVAL BY DR. MALHOTRA AND HE STATED UNDERSTANDING   TACHYCARDIC

## 2020-08-24 DIAGNOSIS — G43.719 INTRACTABLE CHRONIC MIGRAINE WITHOUT AURA AND WITHOUT STATUS MIGRAINOSUS: ICD-10-CM

## 2020-08-24 RX ORDER — BUTALBITAL, ACETAMINOPHEN AND CAFFEINE 50; 325; 40 MG/1; MG/1; MG/1
1 TABLET ORAL EVERY 4 HOURS PRN
Qty: 20 TABLET | Refills: 3 | Status: SHIPPED | OUTPATIENT
Start: 2020-08-24 | End: 2021-03-02 | Stop reason: SDUPTHER

## 2020-09-18 ENCOUNTER — TELEPHONE (OUTPATIENT)
Dept: NEUROLOGY | Facility: CLINIC | Age: 45
End: 2020-09-18

## 2020-09-18 NOTE — TELEPHONE ENCOUNTER
is currently prescribed Qudexy XR. His pharmacy can never obtain this medication. He states the he would like top go back to taking Topamax, would this be ok?

## 2020-09-21 ENCOUNTER — TELEPHONE (OUTPATIENT)
Dept: NEUROLOGY | Facility: CLINIC | Age: 45
End: 2020-09-21

## 2020-09-21 RX ORDER — TOPIRAMATE 50 MG/1
75 TABLET, FILM COATED ORAL 2 TIMES DAILY
Qty: 90 TABLET | Refills: 3 | Status: SHIPPED | OUTPATIENT
Start: 2020-09-21 | End: 2020-09-21

## 2020-09-21 NOTE — TELEPHONE ENCOUNTER
Okay.  I have sent Topamax 50 mg tablet, 1.5 tab in the morning and 1.5 tab at night making it 75 mg twice a day dose.

## 2020-09-21 NOTE — TELEPHONE ENCOUNTER
PATIENT CALLED ABOUT HE TOPIRAMATE 50 MG RX; HE WAS TAKING 200MG AT NIGHT AND HE IS STATING THAT THE DOSAGE IS TO LOW.     PLEASE CALL [phone] WITH UPDATE STATUS

## 2020-09-30 ENCOUNTER — PROCEDURE VISIT (OUTPATIENT)
Dept: NEUROLOGY | Facility: CLINIC | Age: 45
End: 2020-09-30

## 2020-09-30 VITALS
HEIGHT: 71 IN | WEIGHT: 166.6 LBS | SYSTOLIC BLOOD PRESSURE: 114 MMHG | OXYGEN SATURATION: 99 % | HEART RATE: 102 BPM | TEMPERATURE: 97.7 F | DIASTOLIC BLOOD PRESSURE: 88 MMHG | BODY MASS INDEX: 23.32 KG/M2

## 2020-09-30 DIAGNOSIS — G43.719 INTRACTABLE CHRONIC MIGRAINE WITHOUT AURA AND WITHOUT STATUS MIGRAINOSUS: Primary | ICD-10-CM

## 2020-09-30 PROCEDURE — 64615 CHEMODENERV MUSC MIGRAINE: CPT | Performed by: PSYCHIATRY & NEUROLOGY

## 2020-09-30 RX ORDER — ERENUMAB-AOOE 140 MG/ML
INJECTION, SOLUTION SUBCUTANEOUS
COMMUNITY
Start: 2020-09-08 | End: 2020-12-11

## 2020-09-30 RX ORDER — ELETRIPTAN HYDROBROMIDE 40 MG/1
TABLET, FILM COATED ORAL
COMMUNITY
Start: 2020-09-21 | End: 2020-12-28

## 2020-09-30 NOTE — PROGRESS NOTES
CC:  Migraines.    HPI : Patient is in clinic for 2nd Botox injection.  Current headache frequency is approximately 10-12  headache days in a month with some headaches lasting for more than 4 hours.      Botox Procedure Note    Current headache frequency: _10-12_ headaches days / month.    The risks and benefits were discussed with the patient. The patient was given the opportunity to ask questions. Informed consent form was signed.    Onabotulinumtoxin A was reconstituted with 0.9% normal saline. All injections sites were prepped with alcohol swab. Approximately 5 units of botox were injected into each of the following sites bilaterally as per routine migraine botox injection PREEMPT protocol: , procerus, frontalis, temporalis, occipitalis, upper cervical paraspinals, and trapezius.    The patient tolerated the procedure well. There were no immediate complications. The patient will follow up in approximately 12 weeks for her next injection.    Total amount of onabotulinumtoxin A injected was 155 units with 45 units wasted.    Additional notes: Insurance did not approve TROKENDI XR so now he is back on immediate release Topamax 200 mg daily.  In addition, he is using Fioricet and Relpax as needed as an abortive treatment and seems to be working.  I am hoping that with second injection, the headache frequency will go down further and I will see him back in clinic in 12 weeks for third Botox injection.

## 2020-10-15 ENCOUNTER — OFFICE VISIT (OUTPATIENT)
Dept: INTERNAL MEDICINE | Facility: CLINIC | Age: 45
End: 2020-10-15

## 2020-10-15 VITALS
OXYGEN SATURATION: 99 % | HEART RATE: 114 BPM | WEIGHT: 165 LBS | SYSTOLIC BLOOD PRESSURE: 124 MMHG | TEMPERATURE: 97.3 F | DIASTOLIC BLOOD PRESSURE: 82 MMHG | BODY MASS INDEX: 23.02 KG/M2

## 2020-10-15 DIAGNOSIS — M54.32 LEFT SIDED SCIATICA: Primary | ICD-10-CM

## 2020-10-15 PROCEDURE — 99213 OFFICE O/P EST LOW 20 MIN: CPT | Performed by: NURSE PRACTITIONER

## 2020-10-15 RX ORDER — PREDNISONE 5 MG/1
1 TABLET ORAL TAKE AS DIRECTED
Qty: 21 EACH | Refills: 0 | Status: SHIPPED | OUTPATIENT
Start: 2020-10-15 | End: 2020-10-28
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
## 2020-10-28 ENCOUNTER — OFFICE VISIT (OUTPATIENT)
Dept: INTERNAL MEDICINE | Facility: CLINIC | Age: 45
End: 2020-10-28

## 2020-10-28 VITALS
BODY MASS INDEX: 23.74 KG/M2 | OXYGEN SATURATION: 100 % | HEART RATE: 92 BPM | HEIGHT: 71 IN | WEIGHT: 169.6 LBS | DIASTOLIC BLOOD PRESSURE: 80 MMHG | SYSTOLIC BLOOD PRESSURE: 138 MMHG | TEMPERATURE: 96.9 F

## 2020-10-28 DIAGNOSIS — J01.91 ACUTE RECURRENT SINUSITIS, UNSPECIFIED LOCATION: ICD-10-CM

## 2020-10-28 DIAGNOSIS — M54.32 SCIATIC PAIN, LEFT: Primary | ICD-10-CM

## 2020-10-28 DIAGNOSIS — M51.06 INTERVERTEBRAL LUMBAR DISC DISORDER WITH MYELOPATHY, LUMBAR REGION: ICD-10-CM

## 2020-10-28 DIAGNOSIS — F98.8 ATTENTION DEFICIT DISORDER (ADD) WITHOUT HYPERACTIVITY: ICD-10-CM

## 2020-10-28 PROCEDURE — 99214 OFFICE O/P EST MOD 30 MIN: CPT | Performed by: INTERNAL MEDICINE

## 2020-10-28 PROCEDURE — 96372 THER/PROPH/DIAG INJ SC/IM: CPT | Performed by: INTERNAL MEDICINE

## 2020-10-28 RX ORDER — KETOROLAC TROMETHAMINE 10 MG/1
10 TABLET, FILM COATED ORAL 2 TIMES DAILY PRN
Qty: 10 TABLET | Refills: 0 | Status: SHIPPED | OUTPATIENT
Start: 2020-10-28 | End: 2020-12-30

## 2020-10-28 RX ORDER — MELOXICAM 15 MG/1
15 TABLET ORAL DAILY
Qty: 30 TABLET | Refills: 5 | Status: SHIPPED | OUTPATIENT
Start: 2020-10-28 | End: 2020-11-10

## 2020-10-28 RX ORDER — CLARITHROMYCIN 500 MG/1
500 TABLET, COATED ORAL 2 TIMES DAILY
Qty: 20 TABLET | Refills: 0 | Status: SHIPPED | OUTPATIENT
Start: 2020-10-28 | End: 2020-12-30

## 2020-10-28 RX ORDER — KETOROLAC TROMETHAMINE 30 MG/ML
60 INJECTION, SOLUTION INTRAMUSCULAR; INTRAVENOUS ONCE
Status: COMPLETED | OUTPATIENT
Start: 2020-10-28 | End: 2020-10-28

## 2020-10-28 RX ORDER — BACLOFEN 20 MG/1
20 TABLET ORAL 3 TIMES DAILY
Qty: 60 TABLET | Refills: 3 | Status: SHIPPED | OUTPATIENT
Start: 2020-10-28 | End: 2021-02-19

## 2020-10-28 RX ADMIN — KETOROLAC TROMETHAMINE 60 MG: 30 INJECTION, SOLUTION INTRAMUSCULAR; INTRAVENOUS at 14:16

## 2020-10-30 ENCOUNTER — TELEPHONE (OUTPATIENT)
Dept: INTERNAL MEDICINE | Facility: CLINIC | Age: 45
End: 2020-10-30

## 2020-11-02 ENCOUNTER — TRANSCRIBE ORDERS (OUTPATIENT)
Dept: INTERNAL MEDICINE | Facility: CLINIC | Age: 45
End: 2020-11-02

## 2020-11-02 NOTE — TELEPHONE ENCOUNTER
Pt states that he likes to have 2-3 beers on Sunday as this is his only day off and meloxicam has a warning about drinking with it.  Pt advised per Dr Goodson, ok to have beer 1 day a week, just do not take meloxicam on that day.  Pt stated verbal understanding.

## 2020-11-10 ENCOUNTER — TELEPHONE (OUTPATIENT)
Dept: INTERNAL MEDICINE | Facility: CLINIC | Age: 45
End: 2020-11-10

## 2020-11-10 RX ORDER — PREDNISONE 10 MG/1
TABLET ORAL
Qty: 18 TABLET | Refills: 0 | Status: SHIPPED | OUTPATIENT
Start: 2020-11-10 | End: 2020-12-30

## 2020-11-10 RX ORDER — DICLOFENAC SODIUM 75 MG/1
75 TABLET, DELAYED RELEASE ORAL 2 TIMES DAILY
Qty: 30 TABLET | Refills: 1 | Status: SHIPPED | OUTPATIENT
Start: 2020-11-10 | End: 2020-12-30

## 2020-11-10 NOTE — TELEPHONE ENCOUNTER
Pt states that he has used diclofenac in past and it does not help.  He also has a script for indomethacin, that does not help him either.  Wondering if a steroid would be ok for him to do as he is now finished with the toradol.  Pt states that his foot is now numb and he has pain up to his arm and shoulder.  Has MRI Friday.    Please advise

## 2020-11-10 NOTE — TELEPHONE ENCOUNTER
PT IS CALLING IN REGARDING TO THE MEDICATION THAT WAS JUST SENT IN TO PHARMACY. HE WOULD LIKE TO SPEAK TO YOU.

## 2020-11-10 NOTE — TELEPHONE ENCOUNTER
Pt states he has been taking Meloxicam for about 5 days.  Pt states that it is not working and wants something else.    Please advise.

## 2020-11-16 DIAGNOSIS — J01.00 SUBACUTE MAXILLARY SINUSITIS: ICD-10-CM

## 2020-11-16 RX ORDER — MINOCYCLINE HYDROCHLORIDE 50 MG/1
100 CAPSULE ORAL DAILY
Qty: 180 CAPSULE | Refills: 1 | Status: SHIPPED | OUTPATIENT
Start: 2020-11-16 | End: 2020-12-28 | Stop reason: SDUPTHER

## 2020-11-16 NOTE — TELEPHONE ENCOUNTER
Last Office Visit: 10/28/20  Next Office Visit:    Labs completed in past 6 months? Yes     Last Refill Date:05/11/20  Quantity:180  Refills:1    Pharmacy:

## 2020-11-18 ENCOUNTER — TELEPHONE (OUTPATIENT)
Dept: INTERNAL MEDICINE | Facility: CLINIC | Age: 45
End: 2020-11-18

## 2020-11-18 DIAGNOSIS — M54.32 SCIATIC PAIN, LEFT: Primary | ICD-10-CM

## 2020-11-18 NOTE — TELEPHONE ENCOUNTER
Please let him know MRI shows a large L5S1 disk herniation which is pushing on the nerve roots.  I would like him to see Neurosurgery for further evaluation.  Will place referral once I get his okay.    thanks

## 2020-11-18 NOTE — TELEPHONE ENCOUNTER
Caller: Carson Butler    Relationship: Self    Best call back number: [phone]    Caller requesting test results: SELF    What test was performed: MRI    When was the test performed: 11/16/20    Where was the test performed:     Additional notes:

## 2020-11-19 RX ORDER — TRAMADOL HYDROCHLORIDE 50 MG/1
50 TABLET ORAL DAILY PRN
Qty: 20 TABLET | Refills: 0 | Status: SHIPPED | OUTPATIENT
Start: 2020-11-19 | End: 2020-12-30

## 2020-11-19 NOTE — TELEPHONE ENCOUNTER
All I can send in is tramadol. He can also take the diclofenac, as it can help with inflammation.  If he's okay to take it, will call in.    thanks

## 2020-11-19 NOTE — TELEPHONE ENCOUNTER
PT notified asking if he can have medication sent in for pain stated that he has finished the steroids that were RX

## 2020-12-11 DIAGNOSIS — G43.719 INTRACTABLE CHRONIC MIGRAINE WITHOUT AURA AND WITHOUT STATUS MIGRAINOSUS: Primary | ICD-10-CM

## 2020-12-11 RX ORDER — ERENUMAB-AOOE 140 MG/ML
INJECTION, SOLUTION SUBCUTANEOUS
Qty: 1 ML | Refills: 11 | Status: SHIPPED | OUTPATIENT
Start: 2020-12-11 | End: 2021-12-23

## 2020-12-16 ENCOUNTER — TELEPHONE (OUTPATIENT)
Dept: INTERNAL MEDICINE | Facility: CLINIC | Age: 45
End: 2020-12-16

## 2020-12-16 RX ORDER — TIZANIDINE 4 MG/1
4 TABLET ORAL NIGHTLY PRN
Qty: 30 TABLET | Refills: 2 | Status: SHIPPED | OUTPATIENT
Start: 2020-12-16 | End: 2021-01-14

## 2020-12-16 RX ORDER — IBUPROFEN 800 MG/1
800 TABLET ORAL 2 TIMES DAILY PRN
Qty: 40 TABLET | Refills: 2 | Status: SHIPPED | OUTPATIENT
Start: 2020-12-16 | End: 2021-01-14

## 2020-12-26 DIAGNOSIS — G43.719 INTRACTABLE CHRONIC MIGRAINE WITHOUT AURA AND WITHOUT STATUS MIGRAINOSUS: Primary | ICD-10-CM

## 2020-12-28 DIAGNOSIS — F51.01 PRIMARY INSOMNIA: ICD-10-CM

## 2020-12-28 DIAGNOSIS — J01.00 SUBACUTE MAXILLARY SINUSITIS: ICD-10-CM

## 2020-12-28 RX ORDER — ELETRIPTAN HYDROBROMIDE 40 MG/1
TABLET, FILM COATED ORAL
Qty: 9 TABLET | Refills: 6 | Status: SHIPPED | OUTPATIENT
Start: 2020-12-28 | End: 2021-09-20

## 2020-12-28 RX ORDER — MINOCYCLINE HYDROCHLORIDE 50 MG/1
100 CAPSULE ORAL DAILY
Qty: 180 CAPSULE | Refills: 1 | Status: SHIPPED | OUTPATIENT
Start: 2020-12-28 | End: 2021-07-03

## 2020-12-28 RX ORDER — TRAZODONE HYDROCHLORIDE 100 MG/1
100 TABLET ORAL NIGHTLY
Qty: 90 TABLET | Refills: 1 | Status: SHIPPED | OUTPATIENT
Start: 2020-12-28 | End: 2021-03-27

## 2020-12-28 NOTE — TELEPHONE ENCOUNTER
Caller: [id number] [address]  AT [address] [facility] - [phone] [facility] [phone] FX    Relationship: Pharmacy    Best call back number: [phone]  Medication needed:   Requested Prescriptions     Pending Prescriptions Disp Refills   • traZODone (DESYREL) 100 MG tablet 90 tablet 1     Sig: Take 1 tablet by mouth Every Night.   • minocycline (MINOCIN,DYNACIN) 50 MG capsule 180 capsule 1     Sig: Take 2 capsules by mouth Daily.       When do you need the refill by:     What details did the patient provide when requesting the medication:       Does the patient have less than a 3 day supply:  [] Yes  [] No    What is the patient's preferred pharmacy:      [facility] #[id number] [address]  AT [address] [facility] - [phone] [facility] [phone] FX

## 2020-12-30 ENCOUNTER — PROCEDURE VISIT (OUTPATIENT)
Dept: NEUROLOGY | Facility: CLINIC | Age: 45
End: 2020-12-30

## 2020-12-30 VITALS
BODY MASS INDEX: 23.94 KG/M2 | DIASTOLIC BLOOD PRESSURE: 82 MMHG | OXYGEN SATURATION: 99 % | HEART RATE: 115 BPM | WEIGHT: 171 LBS | TEMPERATURE: 97.3 F | HEIGHT: 71 IN | SYSTOLIC BLOOD PRESSURE: 138 MMHG

## 2020-12-30 DIAGNOSIS — G43.719 INTRACTABLE CHRONIC MIGRAINE WITHOUT AURA AND WITHOUT STATUS MIGRAINOSUS: Primary | ICD-10-CM

## 2020-12-30 PROCEDURE — 64615 CHEMODENERV MUSC MIGRAINE: CPT | Performed by: PSYCHIATRY & NEUROLOGY

## 2020-12-30 RX ORDER — RIMEGEPANT SULFATE 75 MG/75MG
TABLET, ORALLY DISINTEGRATING ORAL
COMMUNITY
Start: 2020-11-23 | End: 2020-12-30

## 2020-12-30 RX ORDER — METHYLPHENIDATE HYDROCHLORIDE 10 MG/1
TABLET ORAL
COMMUNITY
Start: 2020-12-29 | End: 2021-04-28

## 2020-12-30 RX ORDER — PROPRANOLOL HYDROCHLORIDE 10 MG/1
TABLET ORAL
COMMUNITY
Start: 2020-12-29 | End: 2020-12-30

## 2020-12-30 NOTE — PROGRESS NOTES
CC:  Migraines.    HPI : Patient is in clinic for 3rd Botox injection.  Current headache frequency is approximately -6-8--  headache days in a month with some headaches lasting for more than 4 hours.      Botox Procedure Note    Current headache frequency: 6-8 headaches days / month.    The risks and benefits were discussed with the patient. The patient was given the opportunity to ask questions. Informed consent form was signed.    Onabotulinumtoxin A was reconstituted with 0.9% normal saline. All injections sites were prepped with alcohol swab. Approximately 5 units of botox were injected into each of the following sites bilaterally as per routine migraine botox injection PREEMPT protocol: , procerus, frontalis, temporalis, occipitalis, upper cervical paraspinals, and trapezius.    The patient tolerated the procedure well. There were no immediate complications. The patient will follow up in approximately 12 weeks for her next injection.    Total amount of onabotulinumtoxin A injected was 155 units with 45 units wasted.    Additional notes: Migraines are improving with continued Botox treatment.  He is reporting reduction in migraine days now to 6-8 migraines per month reduced from 10-12 migraines.  Continue with Aimovig monthly injection.  He is reporting some cognitive side effects with Topamax use so it will be tapered and stopped and instead we will start him on propranolol 10 mg daily for 1 week then 20 mg daily after that.  Based on the response, further dose adjustment will be made.  I will see him back in 12 weeks for follow-up.

## 2021-01-04 ENCOUNTER — TELEPHONE (OUTPATIENT)
Dept: NEUROLOGY | Facility: CLINIC | Age: 46
End: 2021-01-04

## 2021-01-04 NOTE — TELEPHONE ENCOUNTER
OLLIE FROM [facility] CALLED IN REGARDS TO THE PT'S AIMOVIG. SHE STATES THEY HAVE BEEN USING THE  AIMOVIG BRIDGE PROGRAM FOR THE PT WHICH ALLOWS PT TO HAVE 12 FILLS FOR THE PT'S LIFETIME. SHE STATES PT HAS REACHED HIS 12 FILLS AND SHE IS ASSUMING A PA HAS BEEN INITIATED FOR THE PT BUT WOULD LIKE TO KNOW THE STATUS AND NEXT STEPS IF NOT. PLEASE ADVISE.         CALL BACK- [phone]

## 2021-01-04 NOTE — TELEPHONE ENCOUNTER
"Provider:   Caller: MACHO  Relationship to Patient: SELF  Pharmacy: [facility]    Phone Number: [phone]  Reason for Call:   PATIENT CALLED IN TO SEE THE STATUS OF MEDICATION OR THE APPEAL FOR THE P.A, PATIENT STATES HE HAS A MIGRAINE TODAY AND STATES HE WAS DUE FOR HIS SHOT TODAY, HE JUST NEEDS TO TRY TO MAKE THE MIGRAINE GO AWAY AS SOON AS POSSIBLE. HIS RESCUE DRUGS ARE NOT GOING \"CUT IT\"    "

## 2021-01-05 NOTE — TELEPHONE ENCOUNTER
LVM for patient informing him he could stop by the office and get an Aimovig sample until insurance approves coverage.

## 2021-01-10 DIAGNOSIS — G43.019 INTRACTABLE MIGRAINE WITHOUT AURA AND WITHOUT STATUS MIGRAINOSUS: Primary | ICD-10-CM

## 2021-01-11 DIAGNOSIS — J01.00 SUBACUTE MAXILLARY SINUSITIS: ICD-10-CM

## 2021-01-11 NOTE — TELEPHONE ENCOUNTER
Last Office Visit: 10/28/20  Next Office Visit:none    Labs completed in past 6 months? no  Labs completed in past year? yes    Last Refill Date:Not on med list at this time  Quantity:  Refills:    Pharmacy:

## 2021-01-14 ENCOUNTER — OFFICE VISIT (OUTPATIENT)
Dept: NEUROSURGERY | Facility: CLINIC | Age: 46
End: 2021-01-14

## 2021-01-14 VITALS
TEMPERATURE: 98.7 F | HEIGHT: 71 IN | BODY MASS INDEX: 23.66 KG/M2 | WEIGHT: 169 LBS | DIASTOLIC BLOOD PRESSURE: 90 MMHG | SYSTOLIC BLOOD PRESSURE: 140 MMHG

## 2021-01-14 DIAGNOSIS — M51.16 LUMBAR DISC HERNIATION WITH RADICULOPATHY: Primary | ICD-10-CM

## 2021-01-14 DIAGNOSIS — M19.90 ARTHRITIS: ICD-10-CM

## 2021-01-14 PROCEDURE — 99204 OFFICE O/P NEW MOD 45 MIN: CPT | Performed by: NEUROLOGICAL SURGERY

## 2021-01-14 RX ORDER — UBROGEPANT 100 MG/1
TABLET ORAL
COMMUNITY
Start: 2021-01-04 | End: 2021-02-10 | Stop reason: SDUPTHER

## 2021-01-14 RX ORDER — METHYLPHENIDATE HYDROCHLORIDE 36 MG/1
36 TABLET ORAL EVERY MORNING
COMMUNITY
Start: 2020-12-29 | End: 2021-04-28
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
## 2021-02-10 RX ORDER — UBROGEPANT 100 MG/1
100 TABLET ORAL AS NEEDED
Qty: 10 TABLET | Refills: 6 | Status: SHIPPED | OUTPATIENT
Start: 2021-02-10 | End: 2021-09-13

## 2021-02-15 RX ORDER — ONABOTULINUMTOXINA 200 [USP'U]/1
INJECTION, POWDER, LYOPHILIZED, FOR SOLUTION INTRADERMAL; INTRAMUSCULAR
Qty: 1 EACH | Refills: 4 | Status: SHIPPED | OUTPATIENT
Start: 2021-02-15 | End: 2021-06-16
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
## 2021-02-18 PROBLEM — M48.061 STENOSIS OF LATERAL RECESS OF LUMBAR SPINE: Status: ACTIVE | Noted: 2021-02-18

## 2021-02-18 PROBLEM — M51.27 LUMBOSACRAL DISC HERNIATION: Status: ACTIVE | Noted: 2021-02-18

## 2021-02-18 PROBLEM — M54.17 LUMBOSACRAL RADICULOPATHY: Status: ACTIVE | Noted: 2021-02-18

## 2021-02-18 PROBLEM — G47.00 INSOMNIA: Status: ACTIVE | Noted: 2021-02-18

## 2021-02-18 PROBLEM — F90.9 ATTENTION DEFICIT HYPERACTIVITY DISORDER (ADHD): Status: ACTIVE | Noted: 2021-02-18

## 2021-02-19 DIAGNOSIS — M54.32 SCIATIC PAIN, LEFT: ICD-10-CM

## 2021-02-19 RX ORDER — BACLOFEN 20 MG/1
TABLET ORAL
Qty: 60 TABLET | Refills: 3 | Status: SHIPPED | OUTPATIENT
Start: 2021-02-19 | End: 2021-04-28

## 2021-02-19 NOTE — TELEPHONE ENCOUNTER
Last Office Visit: 10/28/20  Next Office Visit: none    Labs completed in past 6 months? yes  Labs completed in past year? yes    Last Refill Date: 1/19/2021  Quantity: 60  Refills: 3    [id number] [address]  AT [facility] - [phone] [facility] [phone] FX

## 2021-02-25 ENCOUNTER — OFFICE VISIT (OUTPATIENT)
Dept: PAIN MEDICINE | Facility: CLINIC | Age: 46
End: 2021-02-25

## 2021-02-25 VITALS
BODY MASS INDEX: 24.08 KG/M2 | SYSTOLIC BLOOD PRESSURE: 124 MMHG | HEIGHT: 71 IN | DIASTOLIC BLOOD PRESSURE: 88 MMHG | TEMPERATURE: 96.4 F | WEIGHT: 172 LBS

## 2021-02-25 DIAGNOSIS — M54.16 LUMBAR RADICULOPATHY: Primary | ICD-10-CM

## 2021-02-25 DIAGNOSIS — G47.00 INSOMNIA, UNSPECIFIED TYPE: ICD-10-CM

## 2021-02-25 DIAGNOSIS — M51.27 LUMBOSACRAL DISC HERNIATION: Primary | ICD-10-CM

## 2021-02-25 DIAGNOSIS — M54.17 LUMBOSACRAL RADICULOPATHY: ICD-10-CM

## 2021-02-25 DIAGNOSIS — M51.27 LUMBOSACRAL DISC HERNIATION: ICD-10-CM

## 2021-02-25 DIAGNOSIS — G43.011 INTRACTABLE MIGRAINE WITHOUT AURA AND WITH STATUS MIGRAINOSUS: ICD-10-CM

## 2021-02-25 DIAGNOSIS — F90.9 ATTENTION DEFICIT HYPERACTIVITY DISORDER (ADHD), UNSPECIFIED ADHD TYPE: ICD-10-CM

## 2021-02-25 DIAGNOSIS — M48.061 STENOSIS OF LATERAL RECESS OF LUMBAR SPINE: ICD-10-CM

## 2021-02-25 PROCEDURE — 99204 OFFICE O/P NEW MOD 45 MIN: CPT | Performed by: ANESTHESIOLOGY

## 2021-02-25 RX ORDER — MULTIVITAMIN WITH IRON
100 TABLET ORAL DAILY
Qty: 90 TABLET | Refills: 0 | Status: SHIPPED | OUTPATIENT
Start: 2021-02-25 | End: 2021-04-28 | Stop reason: SDUPTHER

## 2021-02-25 RX ORDER — GABAPENTIN 100 MG/1
CAPSULE ORAL
Qty: 90 CAPSULE | Refills: 1 | Status: SHIPPED | OUTPATIENT
Start: 2021-02-25 | End: 2021-05-10

## 2021-02-25 RX ORDER — ME-TETRAHYDROFOLATE/B12/HRB236 1-1-500 MG
1 CAPSULE ORAL DAILY
Qty: 90 CAPSULE | Refills: 1 | Status: SHIPPED | OUTPATIENT
Start: 2021-02-25 | End: 2021-03-24

## 2021-02-25 RX ORDER — PROPRANOLOL HYDROCHLORIDE 20 MG/1
30 TABLET ORAL 2 TIMES DAILY
COMMUNITY

## 2021-03-02 ENCOUNTER — TELEPHONE (OUTPATIENT)
Dept: NEUROLOGY | Facility: CLINIC | Age: 46
End: 2021-03-02

## 2021-03-02 DIAGNOSIS — G43.719 INTRACTABLE CHRONIC MIGRAINE WITHOUT AURA AND WITHOUT STATUS MIGRAINOSUS: ICD-10-CM

## 2021-03-02 RX ORDER — BUTALBITAL, ACETAMINOPHEN AND CAFFEINE 50; 325; 40 MG/1; MG/1; MG/1
1 TABLET ORAL EVERY 4 HOURS PRN
Qty: 30 TABLET | Refills: 3 | Status: SHIPPED | OUTPATIENT
Start: 2021-03-02 | End: 2021-06-16 | Stop reason: SDUPTHER

## 2021-03-02 NOTE — TELEPHONE ENCOUNTER
Provider:   Caller: PT  Relationship to Patient: SELF  Pharmacy: [facility]  Phone Number: [phone]  Reason for Call: PT CALLED IN TODAY TO GET AN UPDATE ABOUT THE REFILL ON HIS FIORICET -40 MG HE STATED HE TALKED TO SOMEONE AND THEY TOLD HIM THE PRESCRIPTION IS  AND WAS JUST WANTING AN UPDATE ON HOW TO MOVE FORWARD.   PLEASE ADVISE AND CALL BACK

## 2021-03-08 RX ORDER — TIZANIDINE 4 MG/1
4 TABLET ORAL NIGHTLY PRN
Qty: 30 TABLET | Refills: 2 | OUTPATIENT
Start: 2021-03-08

## 2021-03-09 DIAGNOSIS — M51.27 LUMBOSACRAL DISC HERNIATION: Primary | ICD-10-CM

## 2021-03-17 ENCOUNTER — OUTSIDE FACILITY SERVICE (OUTPATIENT)
Dept: PAIN MEDICINE | Facility: CLINIC | Age: 46
End: 2021-03-17

## 2021-03-17 PROCEDURE — 64483 NJX AA&/STRD TFRM EPI L/S 1: CPT | Performed by: ANESTHESIOLOGY

## 2021-03-17 PROCEDURE — 64484 NJX AA&/STRD TFRM EPI L/S EA: CPT | Performed by: ANESTHESIOLOGY

## 2021-03-17 PROCEDURE — 99152 MOD SED SAME PHYS/QHP 5/>YRS: CPT | Performed by: ANESTHESIOLOGY

## 2021-03-18 ENCOUNTER — TELEPHONE (OUTPATIENT)
Dept: PAIN MEDICINE | Facility: CLINIC | Age: 46
End: 2021-03-18

## 2021-03-18 NOTE — TELEPHONE ENCOUNTER
diagnostic and therapeutic left L5-S1 and left S1 transforaminal epidural steroid injections with the addition of hyaluronidase 03/17/2021.    Attempted to contact patient to see how he is doing since his procedure yesterday.   No answer. Left a brief message with call back number.

## 2021-03-19 NOTE — TELEPHONE ENCOUNTER
Patient requesting something other than tramadol for pain.  Requesting 800mg ibuprofen and Zanaflex instead of the Baclofen.   show

## 2021-03-24 ENCOUNTER — APPOINTMENT (OUTPATIENT)
Dept: NEUROLOGY | Facility: HOSPITAL | Age: 46
End: 2021-03-24

## 2021-03-24 ENCOUNTER — PROCEDURE VISIT (OUTPATIENT)
Dept: NEUROLOGY | Facility: CLINIC | Age: 46
End: 2021-03-24

## 2021-03-24 VITALS
HEART RATE: 78 BPM | SYSTOLIC BLOOD PRESSURE: 140 MMHG | WEIGHT: 165 LBS | HEIGHT: 71 IN | OXYGEN SATURATION: 98 % | TEMPERATURE: 97.1 F | DIASTOLIC BLOOD PRESSURE: 92 MMHG | BODY MASS INDEX: 23.1 KG/M2

## 2021-03-24 DIAGNOSIS — G43.719 INTRACTABLE CHRONIC MIGRAINE WITHOUT AURA AND WITHOUT STATUS MIGRAINOSUS: Primary | ICD-10-CM

## 2021-03-24 PROCEDURE — 64615 CHEMODENERV MUSC MIGRAINE: CPT | Performed by: PSYCHIATRY & NEUROLOGY

## 2021-03-24 RX ORDER — RIMEGEPANT SULFATE 75 MG/75MG
TABLET, ORALLY DISINTEGRATING ORAL
COMMUNITY
Start: 2021-01-19 | End: 2021-06-16

## 2021-03-24 RX ORDER — METHYLPHENIDATE HYDROCHLORIDE 20 MG/1
20 TABLET ORAL DAILY
COMMUNITY
Start: 2021-03-10 | End: 2022-09-07

## 2021-03-24 NOTE — PROGRESS NOTES
CC:  Migraines.    HPI : Patient is in clinic for Botox injection.  Current headache frequency is approximately 6-8  headache days in a month with some headaches lasting for more than 4 hours.      Botox Procedure Note    Current headache frequency: _6-8_ headaches days / month.    The risks and benefits were discussed with the patient. The patient was given the opportunity to ask questions. Informed consent form was signed.    Onabotulinumtoxin A was reconstituted with 0.9% normal saline. All injections sites were prepped with alcohol swab. Approximately 5 units of botox were injected into each of the following sites bilaterally as per routine migraine botox injection PREEMPT protocol: , procerus, frontalis, temporalis, occipitalis, upper cervical paraspinals, and trapezius.    The patient tolerated the procedure well. There were no immediate complications. The patient will follow up in approximately 12 weeks for her next injection.    Total amount of onabotulinumtoxin A injected was 155 units with 45 units wasted.    Additional notes: Migraines under good control with continued Botox treatment.  Continue with Aimovig monthly injections as well.  I will see him back in 12 weeks for next Botox injection.

## 2021-03-27 DIAGNOSIS — F51.01 PRIMARY INSOMNIA: ICD-10-CM

## 2021-03-27 RX ORDER — TRAZODONE HYDROCHLORIDE 100 MG/1
100 TABLET ORAL NIGHTLY
Qty: 90 TABLET | Refills: 1 | Status: SHIPPED | OUTPATIENT
Start: 2021-03-27 | End: 2021-12-16

## 2021-03-27 NOTE — TELEPHONE ENCOUNTER
Last Office Visit: 10/28/20  Next Office Visit:    Labs completed in past 6 months? yes  Labs completed in past year? no    Last Refill Date: 12/28/2019  Quantity:90  Refills:1    Pharmacy:

## 2021-04-14 ENCOUNTER — HOSPITAL ENCOUNTER (OUTPATIENT)
Dept: NEUROLOGY | Facility: HOSPITAL | Age: 46
Discharge: HOME | End: 2021-04-14
Admitting: ANESTHESIOLOGY

## 2021-04-14 DIAGNOSIS — M54.17 LUMBOSACRAL RADICULOPATHY: ICD-10-CM

## 2021-04-14 PROCEDURE — 95911 NRV CNDJ TEST 9-10 STUDIES: CPT

## 2021-04-14 PROCEDURE — 95886 MUSC TEST DONE W/N TEST COMP: CPT

## 2021-04-14 PROCEDURE — 95886 MUSC TEST DONE W/N TEST COMP: CPT | Performed by: PSYCHIATRY & NEUROLOGY

## 2021-04-14 PROCEDURE — 95911 NRV CNDJ TEST 9-10 STUDIES: CPT | Performed by: PSYCHIATRY & NEUROLOGY
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
## 2021-04-28 ENCOUNTER — OFFICE VISIT (OUTPATIENT)
Dept: PAIN MEDICINE | Facility: CLINIC | Age: 46
End: 2021-04-28

## 2021-04-28 VITALS
DIASTOLIC BLOOD PRESSURE: 90 MMHG | BODY MASS INDEX: 24.47 KG/M2 | SYSTOLIC BLOOD PRESSURE: 118 MMHG | TEMPERATURE: 95.8 F | HEART RATE: 91 BPM | RESPIRATION RATE: 14 BRPM | WEIGHT: 174.8 LBS | HEIGHT: 71 IN | OXYGEN SATURATION: 98 %

## 2021-04-28 DIAGNOSIS — G43.011 INTRACTABLE MIGRAINE WITHOUT AURA AND WITH STATUS MIGRAINOSUS: ICD-10-CM

## 2021-04-28 DIAGNOSIS — M51.27 LUMBOSACRAL DISC HERNIATION: ICD-10-CM

## 2021-04-28 DIAGNOSIS — M51.27 LUMBOSACRAL DISC HERNIATION: Primary | ICD-10-CM

## 2021-04-28 DIAGNOSIS — M54.17 LUMBOSACRAL RADICULOPATHY: ICD-10-CM

## 2021-04-28 DIAGNOSIS — F90.9 ATTENTION DEFICIT HYPERACTIVITY DISORDER (ADHD), UNSPECIFIED ADHD TYPE: ICD-10-CM

## 2021-04-28 DIAGNOSIS — G47.00 INSOMNIA, UNSPECIFIED TYPE: ICD-10-CM

## 2021-04-28 DIAGNOSIS — M48.061 STENOSIS OF LATERAL RECESS OF LUMBAR SPINE: ICD-10-CM

## 2021-04-28 PROCEDURE — 99213 OFFICE O/P EST LOW 20 MIN: CPT | Performed by: NURSE PRACTITIONER

## 2021-04-28 RX ORDER — MULTIVITAMIN WITH IRON
100 TABLET ORAL DAILY
Qty: 90 TABLET | Refills: 0 | Status: SHIPPED | OUTPATIENT
Start: 2021-04-28 | End: 2021-09-14 | Stop reason: SDUPTHER

## 2021-05-06 DIAGNOSIS — M51.27 LUMBOSACRAL DISC HERNIATION: Primary | ICD-10-CM

## 2021-05-07 ENCOUNTER — CLINICAL SUPPORT (OUTPATIENT)
Dept: INTERNAL MEDICINE | Facility: CLINIC | Age: 46
End: 2021-05-07

## 2021-05-07 DIAGNOSIS — Z11.1 SCREENING-PULMONARY TB: Primary | ICD-10-CM

## 2021-05-07 LAB
INDURATION: NORMAL
Lab: NORMAL
TB SKIN TEST: NORMAL

## 2021-05-07 PROCEDURE — 86580 TB INTRADERMAL TEST: CPT | Performed by: INTERNAL MEDICINE

## 2021-05-08 DIAGNOSIS — M54.16 LUMBAR RADICULOPATHY: ICD-10-CM

## 2021-05-10 ENCOUNTER — OFFICE VISIT (OUTPATIENT)
Dept: INTERNAL MEDICINE | Facility: CLINIC | Age: 46
End: 2021-05-10

## 2021-05-10 ENCOUNTER — LAB (OUTPATIENT)
Dept: LAB | Facility: HOSPITAL | Age: 46
End: 2021-05-10

## 2021-05-10 VITALS
BODY MASS INDEX: 24.44 KG/M2 | DIASTOLIC BLOOD PRESSURE: 72 MMHG | HEIGHT: 71 IN | OXYGEN SATURATION: 99 % | SYSTOLIC BLOOD PRESSURE: 134 MMHG | WEIGHT: 174.6 LBS | TEMPERATURE: 97.4 F | HEART RATE: 80 BPM

## 2021-05-10 DIAGNOSIS — Z00.00 ROUTINE GENERAL MEDICAL EXAMINATION AT A HEALTH CARE FACILITY: Primary | ICD-10-CM

## 2021-05-10 DIAGNOSIS — M54.17 LUMBOSACRAL RADICULOPATHY: ICD-10-CM

## 2021-05-10 DIAGNOSIS — J01.00 SUBACUTE MAXILLARY SINUSITIS: ICD-10-CM

## 2021-05-10 DIAGNOSIS — Z12.11 SCREENING FOR COLON CANCER: ICD-10-CM

## 2021-05-10 DIAGNOSIS — Z00.00 ROUTINE GENERAL MEDICAL EXAMINATION AT A HEALTH CARE FACILITY: ICD-10-CM

## 2021-05-10 DIAGNOSIS — E55.9 VITAMIN D DEFICIENCY: ICD-10-CM

## 2021-05-10 LAB
25(OH)D3 SERPL-MCNC: 53.8 NG/ML
ALBUMIN SERPL-MCNC: 4.4 G/DL (ref 3.5–5.2)
ALBUMIN/GLOB SERPL: 1.7 G/DL
ALP SERPL-CCNC: 62 U/L (ref 39–117)
ALT SERPL W P-5'-P-CCNC: 29 U/L (ref 1–41)
ANION GAP SERPL CALCULATED.3IONS-SCNC: 7.1 MMOL/L (ref 5–15)
AST SERPL-CCNC: 25 U/L (ref 1–40)
BILIRUB SERPL-MCNC: 0.6 MG/DL (ref 0–1.2)
BUN SERPL-MCNC: 16 MG/DL (ref 6–20)
BUN/CREAT SERPL: 19.5 (ref 7–25)
CALCIUM SPEC-SCNC: 10.2 MG/DL (ref 8.6–10.5)
CHLORIDE SERPL-SCNC: 99 MMOL/L (ref 98–107)
CHOLEST SERPL-MCNC: 242 MG/DL (ref 0–200)
CO2 SERPL-SCNC: 32.9 MMOL/L (ref 22–29)
CREAT SERPL-MCNC: 0.82 MG/DL (ref 0.76–1.27)
DEPRECATED RDW RBC AUTO: 40.4 FL (ref 37–54)
ERYTHROCYTE [DISTWIDTH] IN BLOOD BY AUTOMATED COUNT: 13 % (ref 12.3–15.4)
GFR SERPL CREATININE-BSD FRML MDRD: 101 ML/MIN/1.73
GLOBULIN UR ELPH-MCNC: 2.6 GM/DL
GLUCOSE SERPL-MCNC: 92 MG/DL (ref 65–99)
HCT VFR BLD AUTO: 43.6 % (ref 37.5–51)
HDLC SERPL-MCNC: 46 MG/DL (ref 40–60)
HGB BLD-MCNC: 15.3 G/DL (ref 13–17.7)
LDLC SERPL CALC-MCNC: 165 MG/DL (ref 0–100)
LDLC/HDLC SERPL: 3.52 {RATIO}
MCH RBC QN AUTO: 30.5 PG (ref 26.6–33)
MCHC RBC AUTO-ENTMCNC: 35.1 G/DL (ref 31.5–35.7)
MCV RBC AUTO: 86.9 FL (ref 79–97)
PLATELET # BLD AUTO: 349 10*3/MM3 (ref 140–450)
PMV BLD AUTO: 9.7 FL (ref 6–12)
POTASSIUM SERPL-SCNC: 4.9 MMOL/L (ref 3.5–5.2)
PROT SERPL-MCNC: 7 G/DL (ref 6–8.5)
RBC # BLD AUTO: 5.02 10*6/MM3 (ref 4.14–5.8)
SODIUM SERPL-SCNC: 139 MMOL/L (ref 136–145)
TRIGL SERPL-MCNC: 171 MG/DL (ref 0–150)
VIT B12 BLD-MCNC: 1279 PG/ML (ref 211–946)
VLDLC SERPL-MCNC: 31 MG/DL (ref 5–40)
WBC # BLD AUTO: 6.95 10*3/MM3 (ref 3.4–10.8)

## 2021-05-10 PROCEDURE — 80053 COMPREHEN METABOLIC PANEL: CPT

## 2021-05-10 PROCEDURE — 85027 COMPLETE CBC AUTOMATED: CPT

## 2021-05-10 PROCEDURE — 84439 ASSAY OF FREE THYROXINE: CPT

## 2021-05-10 PROCEDURE — 99396 PREV VISIT EST AGE 40-64: CPT | Performed by: INTERNAL MEDICINE

## 2021-05-10 PROCEDURE — 84443 ASSAY THYROID STIM HORMONE: CPT

## 2021-05-10 PROCEDURE — 81003 URINALYSIS AUTO W/O SCOPE: CPT | Performed by: INTERNAL MEDICINE

## 2021-05-10 PROCEDURE — 82607 VITAMIN B-12: CPT

## 2021-05-10 PROCEDURE — 82306 VITAMIN D 25 HYDROXY: CPT

## 2021-05-10 PROCEDURE — 80061 LIPID PANEL: CPT

## 2021-05-10 RX ORDER — GABAPENTIN 100 MG/1
CAPSULE ORAL
Qty: 90 CAPSULE | Refills: 1 | Status: SHIPPED | OUTPATIENT
Start: 2021-05-10 | End: 2021-07-13

## 2021-05-10 RX ORDER — AMOXICILLIN AND CLAVULANATE POTASSIUM 875; 125 MG/1; MG/1
1 TABLET, FILM COATED ORAL 2 TIMES DAILY
Qty: 20 TABLET | Refills: 0 | Status: SHIPPED | OUTPATIENT
Start: 2021-05-10 | End: 2021-06-16

## 2021-05-10 RX ORDER — METHYLPREDNISOLONE 4 MG/1
TABLET ORAL
Qty: 1 EACH | Refills: 0 | Status: SHIPPED | OUTPATIENT
Start: 2021-05-10 | End: 2021-06-16

## 2021-05-11 LAB
T4 FREE SERPL-MCNC: 1.41 NG/DL (ref 0.93–1.7)
TSH SERPL DL<=0.05 MIU/L-ACNC: 0.72 UIU/ML (ref 0.27–4.2)

## 2021-05-12 ENCOUNTER — TELEPHONE (OUTPATIENT)
Dept: NEUROLOGY | Facility: CLINIC | Age: 46
End: 2021-05-12

## 2021-05-12 NOTE — TELEPHONE ENCOUNTER
Caller: ANGI WITH [facility]    Relationship:     Best call back number: [phone]    What medications are you currently taking:   Current Outpatient Medications on File Prior to Visit   Medication Sig Dispense Refill   • Aimovig 140 MG/ML prefilled syringe INJECT 1ML(140MG) UNDER THE SKIN INTO THE APPROPRIATE AREA AS DIRECTED EVERY 30 DAYS 1 mL 11   • amoxicillin-clavulanate (Augmentin) 875-125 MG per tablet Take 1 tablet by mouth 2 (Two) Times a Day. 20 tablet 0   • Botox 200 units reconstituted solution INJECT 200 UNITS INTO THE MUSCLES OF THE HEAD, NECK AND SHOULDER EVERY 12 WEEKS PER FDA PROTOCOL FOR CHRONIC MIGRAINE. 1 each 4   • butalbital-acetaminophen-caffeine (FIORICET, ESGIC) -40 MG per tablet Take 1 tablet by mouth Every 4 (Four) Hours As Needed for Headache. 30 tablet 3   • Cholecalciferol (VITAMIN D) 2000 UNITS capsule Take  by mouth.     • eletriptan (RELPAX) 40 MG tablet TAKE 1 TABLET BY MOUTH AS NEEDED FOR MIGRAINE 9 tablet 6   • gabapentin (NEURONTIN) 100 MG capsule TAKE ONE CAPSULE BY MOUTH THREE TIMES DAILY. START ON A NONWORKING DAY. 90 capsule 1   • methylphenidate (RITALIN) 20 MG tablet Take 20 mg by mouth Daily. 20mg twice daily     • methylPREDNISolone (MEDROL) 4 MG dose pack Take as directed on package instructions. 1 each 0   • minocycline (MINOCIN,DYNACIN) 50 MG capsule Take 2 capsules by mouth Daily. 180 capsule 1   • Nurtec 75 MG dispersible tablet DISSOLVE ONE TABLET BY MOUTH AS NEEDED MIGRAINE     • propranolol (INDERAL) 20 MG tablet Take 30 mg by mouth 2 (two) times a day. Patient takes 20 mg QHS and 10 mg am     • Pseudoephedrine HCl (SUDAFED 24 HOUR PO)      • psyllium (METAMUCIL) 58.6 % packet Take 1 packet by mouth Daily.     • traZODone (DESYREL) 100 MG tablet TAKE 1 TABLET BY MOUTH EVERY NIGHT 90 tablet 1   • ubrogepant 100 MG tablet Take 1 tablet by mouth As Needed (Migraine). 10 tablet 6   • vitamin B-6 (PYRIDOXINE) 100 MG tablet Take 1 tablet by mouth Daily. 90  tablet 0     No current facility-administered medications on file prior to visit.        When did you start taking these medications: NA    Which medication are you concerned about: NURTEC    Who prescribed you this medication:     What are your concerns: ANGI STATES THAT THERE WAS AN ERROR MESSAGE THAT STATES THIS MAY BE EXCLUDED FROM THE PLANS BENEFITS FOR PA ON THIS RX. THEY ARE WANTING TO KNOW IF WE WISH TO CONTINUE WITH A PA. THE [facility] PHONE NUMBER TO RESOLVE THIS IS:     [phone]      REFERENCE GUZMAN: [id number]    How long have you been taking these medications: NA    How long have you had these concerns: NA

## 2021-05-19 ENCOUNTER — OUTSIDE FACILITY SERVICE (OUTPATIENT)
Dept: PAIN MEDICINE | Facility: CLINIC | Age: 46
End: 2021-05-19

## 2021-05-19 PROCEDURE — 64484 NJX AA&/STRD TFRM EPI L/S EA: CPT | Performed by: ANESTHESIOLOGY

## 2021-05-19 PROCEDURE — 64483 NJX AA&/STRD TFRM EPI L/S 1: CPT | Performed by: ANESTHESIOLOGY

## 2021-05-19 PROCEDURE — 99152 MOD SED SAME PHYS/QHP 5/>YRS: CPT | Performed by: ANESTHESIOLOGY

## 2021-05-20 ENCOUNTER — TELEPHONE (OUTPATIENT)
Dept: PAIN MEDICINE | Facility: CLINIC | Age: 46
End: 2021-05-20

## 2021-05-20 NOTE — TELEPHONE ENCOUNTER
Spoke with pt regarding yesterdays procedure and pt stated that he was feeling fine. Advised of follow up appointment, and pt stated that he received the email, and that he would call back to reschedule at a later time.

## 2021-05-24 DIAGNOSIS — Z12.11 ENCOUNTER FOR SCREENING COLONOSCOPY: Primary | ICD-10-CM

## 2021-06-02 ENCOUNTER — OUTSIDE FACILITY SERVICE (OUTPATIENT)
Dept: GASTROENTEROLOGY | Facility: CLINIC | Age: 46
End: 2021-06-02

## 2021-06-02 PROCEDURE — 88305 TISSUE EXAM BY PATHOLOGIST: CPT | Performed by: INTERNAL MEDICINE

## 2021-06-02 PROCEDURE — 45385 COLONOSCOPY W/LESION REMOVAL: CPT | Performed by: INTERNAL MEDICINE

## 2021-06-03 ENCOUNTER — LAB REQUISITION (OUTPATIENT)
Dept: LAB | Facility: HOSPITAL | Age: 46
End: 2021-06-03

## 2021-06-03 DIAGNOSIS — Z12.11 ENCOUNTER FOR SCREENING FOR MALIGNANT NEOPLASM OF COLON: ICD-10-CM

## 2021-06-04 LAB
CYTO UR: NORMAL
LAB AP CASE REPORT: NORMAL
LAB AP CLINICAL INFORMATION: NORMAL
PATH REPORT.FINAL DX SPEC: NORMAL
PATH REPORT.GROSS SPEC: NORMAL

## 2021-06-09 ENCOUNTER — TELEPHONE (OUTPATIENT)
Dept: NEUROLOGY | Facility: CLINIC | Age: 46
End: 2021-06-09

## 2021-06-09 NOTE — TELEPHONE ENCOUNTER
Caller: RADHA WITH [facility]    Relationship:     Best call back number: [phone]    CASE ID: [id number]    What medications are you currently taking:   Current Outpatient Medications on File Prior to Visit   Medication Sig Dispense Refill   • Aimovig 140 MG/ML prefilled syringe INJECT 1ML(140MG) UNDER THE SKIN INTO THE APPROPRIATE AREA AS DIRECTED EVERY 30 DAYS 1 mL 11   • amoxicillin-clavulanate (Augmentin) 875-125 MG per tablet Take 1 tablet by mouth 2 (Two) Times a Day. 20 tablet 0   • Botox 200 units reconstituted solution INJECT 200 UNITS INTO THE MUSCLES OF THE HEAD, NECK AND SHOULDER EVERY 12 WEEKS PER FDA PROTOCOL FOR CHRONIC MIGRAINE. 1 each 4   • butalbital-acetaminophen-caffeine (FIORICET, ESGIC) -40 MG per tablet Take 1 tablet by mouth Every 4 (Four) Hours As Needed for Headache. 30 tablet 3   • Cholecalciferol (VITAMIN D) 2000 UNITS capsule Take  by mouth.     • eletriptan (RELPAX) 40 MG tablet TAKE 1 TABLET BY MOUTH AS NEEDED FOR MIGRAINE 9 tablet 6   • gabapentin (NEURONTIN) 100 MG capsule TAKE ONE CAPSULE BY MOUTH THREE TIMES DAILY. START ON A NONWORKING DAY. 90 capsule 1   • methylphenidate (RITALIN) 20 MG tablet Take 20 mg by mouth Daily. 20mg twice daily     • methylPREDNISolone (MEDROL) 4 MG dose pack Take as directed on package instructions. 1 each 0   • minocycline (MINOCIN,DYNACIN) 50 MG capsule Take 2 capsules by mouth Daily. 180 capsule 1   • Nurtec 75 MG dispersible tablet DISSOLVE ONE TABLET BY MOUTH AS NEEDED MIGRAINE     • propranolol (INDERAL) 20 MG tablet Take 30 mg by mouth 2 (two) times a day. Patient takes 20 mg QHS and 10 mg am     • Pseudoephedrine HCl (SUDAFED 24 HOUR PO)      • psyllium (METAMUCIL) 58.6 % packet Take 1 packet by mouth Daily.     • Sod Picosulfate-Mag Ox-Cit Acd 10-3.5-12 MG-GM -GM/160ML solution Take 1 kit by mouth Take As Directed. 320 mL 0   • traZODone (DESYREL) 100 MG tablet TAKE 1 TABLET BY MOUTH EVERY NIGHT 90 tablet 1   • ubrogepant 100 MG  tablet Take 1 tablet by mouth As Needed (Migraine). 10 tablet 6   • vitamin B-6 (PYRIDOXINE) 100 MG tablet Take 1 tablet by mouth Daily. 90 tablet 0     No current facility-administered medications on file prior to visit.        When did you start taking these medications: NA    Which medication are you concerned about: BOTOX    Who prescribed you this medication:     What are your concerns: [facility] STATES THEY NEED AN AUTH FOR BOTOX.    How long have you been taking these medications: NA    How long have you had these concerns: NA

## 2021-06-16 ENCOUNTER — PROCEDURE VISIT (OUTPATIENT)
Dept: NEUROLOGY | Facility: CLINIC | Age: 46
End: 2021-06-16

## 2021-06-16 VITALS
HEIGHT: 71 IN | DIASTOLIC BLOOD PRESSURE: 84 MMHG | OXYGEN SATURATION: 98 % | BODY MASS INDEX: 23.8 KG/M2 | HEART RATE: 87 BPM | WEIGHT: 170 LBS | SYSTOLIC BLOOD PRESSURE: 120 MMHG

## 2021-06-16 DIAGNOSIS — G43.719 INTRACTABLE CHRONIC MIGRAINE WITHOUT AURA AND WITHOUT STATUS MIGRAINOSUS: Primary | ICD-10-CM

## 2021-06-16 PROCEDURE — 64615 CHEMODENERV MUSC MIGRAINE: CPT | Performed by: PSYCHIATRY & NEUROLOGY

## 2021-06-16 RX ORDER — BUTALBITAL, ACETAMINOPHEN AND CAFFEINE 50; 325; 40 MG/1; MG/1; MG/1
1 TABLET ORAL EVERY 4 HOURS PRN
Qty: 8 TABLET | Refills: 3 | Status: SHIPPED | OUTPATIENT
Start: 2021-06-16 | End: 2021-07-06 | Stop reason: SDUPTHER

## 2021-06-16 RX ORDER — RIMEGEPANT SULFATE 75 MG/75MG
75 TABLET, ORALLY DISINTEGRATING ORAL AS NEEDED
Qty: 10 TABLET | Refills: 3 | Status: SHIPPED | OUTPATIENT
Start: 2021-06-16 | End: 2021-09-08 | Stop reason: SDUPTHER

## 2021-06-16 NOTE — PROGRESS NOTES
CC:  Migraines.    HPI : Patient is in clinic for Botox injection.  Current headache frequency is approximately 5-6  headache days in a month with some headaches lasting for more than 4 hours.      Botox Procedure Note    Current headache frequency: _5-6_ headaches days / month.    The risks and benefits were discussed with the patient. The patient was given the opportunity to ask questions. Informed consent form was signed.    Onabotulinumtoxin A was reconstituted with 0.9% normal saline. All injections sites were prepped with alcohol swab. Approximately 5 units of botox were injected into each of the following sites bilaterally as per routine migraine botox injection PREEMPT protocol: , procerus, frontalis, temporalis, occipitalis, upper cervical paraspinals, and trapezius.    The patient tolerated the procedure well. There were no immediate complications. The patient will follow up in approximately 12 weeks for her next injection.    Total amount of onabotulinumtoxin A injected was 155 units with 45 units wasted.    Additional notes: Overall, migraine intensity and frequency remain under good control with combination of Aimovig and Botox.  He is using Nurtec alternating with Ubrelvy as an abortive treatment and seems to be working well.  I will see him back in 12 weeks for next injection.

## 2021-07-03 DIAGNOSIS — J01.00 SUBACUTE MAXILLARY SINUSITIS: ICD-10-CM

## 2021-07-03 RX ORDER — MINOCYCLINE HYDROCHLORIDE 50 MG/1
100 CAPSULE ORAL DAILY
Qty: 180 CAPSULE | Refills: 1 | Status: SHIPPED | OUTPATIENT
Start: 2021-07-03 | End: 2022-03-16 | Stop reason: SDUPTHER

## 2021-07-03 NOTE — TELEPHONE ENCOUNTER
Last Office Visit: 5/10/21  Next Office Visit:11/10/21    Labs completed in past 6 months? yes  Labs completed in past year? yes    Last Refill Date:12/28/20  Quantity:180  Refills:1    Pharmacy:     Please review pended refill request for any changes needed on refills or quantities. Thank you!

## 2021-07-06 ENCOUNTER — TELEPHONE (OUTPATIENT)
Dept: NEUROLOGY | Facility: CLINIC | Age: 46
End: 2021-07-06

## 2021-07-06 DIAGNOSIS — G43.719 INTRACTABLE CHRONIC MIGRAINE WITHOUT AURA AND WITHOUT STATUS MIGRAINOSUS: ICD-10-CM

## 2021-07-06 RX ORDER — BUTALBITAL, ACETAMINOPHEN AND CAFFEINE 50; 325; 40 MG/1; MG/1; MG/1
1 TABLET ORAL EVERY 4 HOURS PRN
Qty: 15 TABLET | Refills: 3 | Status: SHIPPED | OUTPATIENT
Start: 2021-07-06 | End: 2021-07-27 | Stop reason: SDUPTHER

## 2021-07-06 NOTE — TELEPHONE ENCOUNTER
Provider: DR. MALHOTRA   Caller: MACHO   Relationship to Patient: PT   Pharmacy: [facility]   Phone Number: [phone]  Reason for Call: PT IS CALLING ABOUT THE QUANTITY REDUCTION OF butalbital-acetaminophen-caffeine (FIORICET, ESGIC) -40 MG per tablet   PT STATES THAT HE USUALLY GETS 30 OF THESE. PLEASE ADVISE ON THE CHANGE. PT HAS NOT REFILLED IT THIS MONTH. PT IS GOING OUT OF TOWN TOMORROW MORNING. PT IS NOT COMPLETELY OUT YET BUT ONLY HAS TWO LEFT.

## 2021-07-13 ENCOUNTER — TELEPHONE (OUTPATIENT)
Dept: PAIN MEDICINE | Facility: CLINIC | Age: 46
End: 2021-07-13

## 2021-07-13 DIAGNOSIS — M54.16 LUMBAR RADICULOPATHY: Primary | ICD-10-CM

## 2021-07-13 RX ORDER — GABAPENTIN 300 MG/1
300 CAPSULE ORAL 2 TIMES DAILY
Qty: 60 CAPSULE | Refills: 1 | Status: SHIPPED | OUTPATIENT
Start: 2021-07-13 | End: 2021-08-11

## 2021-07-13 NOTE — TELEPHONE ENCOUNTER
Caller: PATIENT    Relationship to patient:     Best call back number: [phone]    Chief complaint: LOW BACK PAIN WITH SCIATICA     Type of visit: FOLLOW UP  - INJECTION     Requested date: ASAP    If rescheduling, when is the original appointment:      Additional notes: HAS APPT WITH JESICA DENISE ON 8/11/21 , PATIENT STATES HE NEEDS TO BE SEEN SOONER , THE PAIN IS WORSE THAN IT HAS EVER BEEN. HE WOULD LIKE TO HAVE ANOTHER INJECTION   PLEASE CALL TO ADVISE    PATIENT ALSO NEEDS A RX FOR GABAPENTIN 100 MG 1 PO TID   GOES TO THE [facility] [phone] - PATIENT STATES THAT RX IS NOT STRONG ENOUGH

## 2021-07-16 ENCOUNTER — OFFICE VISIT (OUTPATIENT)
Dept: INTERNAL MEDICINE | Facility: CLINIC | Age: 46
End: 2021-07-16

## 2021-07-16 VITALS
OXYGEN SATURATION: 98 % | WEIGHT: 170 LBS | SYSTOLIC BLOOD PRESSURE: 132 MMHG | HEART RATE: 92 BPM | TEMPERATURE: 98.7 F | BODY MASS INDEX: 23.71 KG/M2 | DIASTOLIC BLOOD PRESSURE: 100 MMHG

## 2021-07-16 DIAGNOSIS — J01.11 ACUTE RECURRENT FRONTAL SINUSITIS: Primary | ICD-10-CM

## 2021-07-16 PROCEDURE — 99214 OFFICE O/P EST MOD 30 MIN: CPT | Performed by: PHYSICIAN ASSISTANT

## 2021-07-16 RX ORDER — AMOXICILLIN AND CLAVULANATE POTASSIUM 875; 125 MG/1; MG/1
1 TABLET, FILM COATED ORAL 2 TIMES DAILY
Qty: 20 TABLET | Refills: 0 | Status: SHIPPED | OUTPATIENT
Start: 2021-07-16 | End: 2021-09-14

## 2021-07-16 RX ORDER — METHYLPHENIDATE HYDROCHLORIDE 27 MG/1
27 TABLET ORAL DAILY
COMMUNITY
Start: 2021-06-30 | End: 2022-11-21 | Stop reason: SDUPTHER

## 2021-07-16 RX ORDER — PROPRANOLOL HYDROCHLORIDE 10 MG/1
10 TABLET ORAL EVERY MORNING
COMMUNITY
Start: 2021-06-30

## 2021-07-16 RX ORDER — PREDNISONE 10 MG/1
TABLET ORAL
Qty: 30 TABLET | Refills: 0 | Status: SHIPPED | OUTPATIENT
Start: 2021-07-16 | End: 2021-09-14

## 2021-07-16 NOTE — PROGRESS NOTES
Chief Complaint   Patient presents with   • Sinus Pressure/Drainage     Acute        Subjective     Carson Butler III is a 45 y.o. male.        History of Present Illness     Pt has chronic sinus congestion year round. He has had 2 sinus surgeries and did not get much improvement with the surgeries. Now he deals with the sinus congestion until he develops signs of bacterial infection. Uses saline rinse daily. He has developed bloody drainage, head feels stopped up, sinus pressure and pain. No fever or cough.         Current Outpatient Medications:   •  Aimovig 140 MG/ML prefilled syringe, INJECT 1ML(140MG) UNDER THE SKIN INTO THE APPROPRIATE AREA AS DIRECTED EVERY 30 DAYS, Disp: 1 mL, Rfl: 11  •  butalbital-acetaminophen-caffeine (FIORICET, ESGIC) -40 MG per tablet, Take 1 tablet by mouth Every 4 (Four) Hours As Needed for Headache., Disp: 15 tablet, Rfl: 3  •  Cholecalciferol (VITAMIN D) 2000 UNITS capsule, Take  by mouth., Disp: , Rfl:   •  eletriptan (RELPAX) 40 MG tablet, TAKE 1 TABLET BY MOUTH AS NEEDED FOR MIGRAINE, Disp: 9 tablet, Rfl: 6  •  gabapentin (NEURONTIN) 300 MG capsule, Take 1 capsule by mouth 2 (two) times a day., Disp: 60 capsule, Rfl: 1  •  methylphenidate (RITALIN) 20 MG tablet, Take 20 mg by mouth Daily. 20mg twice daily, Disp: , Rfl:   •  methylphenidate 27 MG CR tablet, Take 27 mg by mouth Daily, Disp: , Rfl:   •  minocycline (MINOCIN,DYNACIN) 50 MG capsule, TAKE 2 CAPSULES BY MOUTH DAILY, Disp: 180 capsule, Rfl: 1  •  propranolol (INDERAL) 10 MG tablet, Take 10 mg by mouth Every Morning., Disp: , Rfl:   •  propranolol (INDERAL) 20 MG tablet, Take 30 mg by mouth 2 (two) times a day. Patient takes 20 mg QHS and 10 mg am, Disp: , Rfl:   •  Pseudoephedrine HCl (SUDAFED 24 HOUR PO), , Disp: , Rfl:   •  Pseudoephedrine HCl  MG tablet sustained-release 24 hour, Take one tablet by mouth daily as needed, Disp: 90 each, Rfl: 1  •  psyllium (METAMUCIL) 58.6 % packet, Take 1 packet by  mouth Daily., Disp: , Rfl:   •  Rimegepant Sulfate (Nurtec) 75 MG tablet dispersible tablet, Take 1 tablet by mouth As Needed (MIGRAINE)., Disp: 10 tablet, Rfl: 3  •  traZODone (DESYREL) 100 MG tablet, TAKE 1 TABLET BY MOUTH EVERY NIGHT, Disp: 90 tablet, Rfl: 1  •  ubrogepant 100 MG tablet, Take 1 tablet by mouth As Needed (Migraine)., Disp: 10 tablet, Rfl: 6  •  vitamin B-6 (PYRIDOXINE) 100 MG tablet, Take 1 tablet by mouth Daily., Disp: 90 tablet, Rfl: 0  •  amoxicillin-clavulanate (Augmentin) 875-125 MG per tablet, Take 1 tablet by mouth 2 (Two) Times a Day., Disp: 20 tablet, Rfl: 0  •  predniSONE (DELTASONE) 10 MG tablet, Take 4 tablets for 3 days, then 3 tablets for 3 days, then 2 tablets for 3 days, then 1 tablet for 3 days, Disp: 30 tablet, Rfl: 0     PMFSH  The following portions of the patient's history were reviewed and updated as appropriate: allergies, current medications, past family history, past medical history, past social history, past surgical history and problem list.    Review of Systems   Constitutional: Positive for fatigue. Negative for activity change and unexpected weight change.   HENT: Positive for congestion, postnasal drip, sinus pressure, sinus pain and sore throat. Negative for ear pain.    Eyes: Negative for pain and discharge.   Respiratory: Positive for cough. Negative for chest tightness, shortness of breath and wheezing.    Cardiovascular: Negative for chest pain and palpitations.   Gastrointestinal: Negative for abdominal pain, diarrhea and vomiting.   Endocrine: Negative.    Genitourinary: Negative.    Musculoskeletal: Negative for joint swelling.   Skin: Negative for color change, rash and wound.   Allergic/Immunologic: Negative.    Neurological: Negative for seizures and syncope.   Psychiatric/Behavioral: Negative.        Objective   /100   Pulse 92   Temp 98.7 °F (37.1 °C)   Wt 77.1 kg (170 lb)   SpO2 98%   BMI 23.71 kg/m²     Physical Exam  Vitals and nursing note  reviewed.   Constitutional:       General: He is not in acute distress.     Appearance: He is well-developed. He is not toxic-appearing or diaphoretic.   HENT:      Head: Normocephalic and atraumatic. Hair is normal.      Right Ear: External ear normal. No drainage, swelling or tenderness. Tympanic membrane is retracted.      Left Ear: External ear normal. No drainage, swelling or tenderness. Tympanic membrane is retracted.      Nose: Mucosal edema present.      Right Sinus: Maxillary sinus tenderness and frontal sinus tenderness present.      Left Sinus: Maxillary sinus tenderness and frontal sinus tenderness present.      Mouth/Throat:      Mouth: No oral lesions.      Pharynx: Uvula midline. Posterior oropharyngeal erythema present. No oropharyngeal exudate or uvula swelling.   Eyes:      General: No scleral icterus.        Right eye: No discharge.         Left eye: No discharge.      Conjunctiva/sclera: Conjunctivae normal.      Pupils: Pupils are equal, round, and reactive to light.   Cardiovascular:      Rate and Rhythm: Normal rate and regular rhythm.      Heart sounds: Normal heart sounds. No murmur heard.   No gallop.    Pulmonary:      Effort: No respiratory distress.      Breath sounds: Normal breath sounds. No stridor. No wheezing or rales.   Chest:      Chest wall: No tenderness.   Abdominal:      Palpations: Abdomen is soft.      Tenderness: There is no abdominal tenderness.   Musculoskeletal:      Cervical back: Normal range of motion and neck supple.   Lymphadenopathy:      Cervical: Cervical adenopathy present.   Skin:     General: Skin is warm and dry.      Findings: No rash.   Neurological:      Mental Status: He is alert and oriented to person, place, and time.      Motor: No abnormal muscle tone.   Psychiatric:         Behavior: Behavior normal.         Thought Content: Thought content normal.         Judgment: Judgment normal.              ASSESSMENT/PLAN    Diagnoses and all orders for this  visit:    1. Acute recurrent frontal sinusitis (Primary)  Comments:  Acute on chronic infection. Treat with augmentin and prednisone taper as ordered.  Orders:  -     amoxicillin-clavulanate (Augmentin) 875-125 MG per tablet; Take 1 tablet by mouth 2 (Two) Times a Day.  Dispense: 20 tablet; Refill: 0  -     predniSONE (DELTASONE) 10 MG tablet; Take 4 tablets for 3 days, then 3 tablets for 3 days, then 2 tablets for 3 days, then 1 tablet for 3 days  Dispense: 30 tablet; Refill: 0             Return if symptoms worsen or fail to improve, for Next scheduled follow up.

## 2021-07-19 NOTE — PROGRESS NOTES
I have reviewed the notes, assessments, and/or procedures performed by Amanda Loza PA-C, I concur with her/his documentation of Carson Butler III.

## 2021-07-27 DIAGNOSIS — M51.27 LUMBOSACRAL DISC HERNIATION: Primary | ICD-10-CM

## 2021-07-27 DIAGNOSIS — G43.719 INTRACTABLE CHRONIC MIGRAINE WITHOUT AURA AND WITHOUT STATUS MIGRAINOSUS: ICD-10-CM

## 2021-07-27 RX ORDER — BUTALBITAL, ACETAMINOPHEN AND CAFFEINE 50; 325; 40 MG/1; MG/1; MG/1
1 TABLET ORAL EVERY 4 HOURS PRN
Qty: 15 TABLET | Refills: 3 | Status: SHIPPED | OUTPATIENT
Start: 2021-07-27 | End: 2021-09-08 | Stop reason: SDUPTHER

## 2021-07-27 NOTE — TELEPHONE ENCOUNTER
----- Message from Carson Butler III sent at 7/19/2021  1:18 AM EDT -----  Regarding: RE: Prescription Question  Contact: [phone]  It was not an insurance issue. Could you check with Dr. Lepe please. Thanks

## 2021-07-27 NOTE — TELEPHONE ENCOUNTER
Will you send in this medication with 30 as quantity with 4 refills. Patient stated he used to be prescribed that but now its changed.

## 2021-08-11 ENCOUNTER — OFFICE VISIT (OUTPATIENT)
Dept: PAIN MEDICINE | Facility: CLINIC | Age: 46
End: 2021-08-11

## 2021-08-11 VITALS
OXYGEN SATURATION: 100 % | SYSTOLIC BLOOD PRESSURE: 155 MMHG | HEIGHT: 71 IN | BODY MASS INDEX: 24.47 KG/M2 | WEIGHT: 174.8 LBS | DIASTOLIC BLOOD PRESSURE: 105 MMHG | HEART RATE: 82 BPM | TEMPERATURE: 96.2 F

## 2021-08-11 DIAGNOSIS — M51.27 LUMBOSACRAL DISC HERNIATION: ICD-10-CM

## 2021-08-11 DIAGNOSIS — M54.16 LUMBAR RADICULOPATHY: ICD-10-CM

## 2021-08-11 DIAGNOSIS — F90.9 ATTENTION DEFICIT HYPERACTIVITY DISORDER (ADHD), UNSPECIFIED ADHD TYPE: ICD-10-CM

## 2021-08-11 DIAGNOSIS — M48.061 STENOSIS OF LATERAL RECESS OF LUMBAR SPINE: ICD-10-CM

## 2021-08-11 PROCEDURE — 99213 OFFICE O/P EST LOW 20 MIN: CPT | Performed by: NURSE PRACTITIONER

## 2021-08-11 RX ORDER — GABAPENTIN 300 MG/1
300 CAPSULE ORAL 4 TIMES DAILY
Qty: 120 CAPSULE | Refills: 1 | Status: SHIPPED | OUTPATIENT
Start: 2021-08-11 | End: 2021-09-21 | Stop reason: SDUPTHER

## 2021-08-11 RX ORDER — GABAPENTIN 300 MG/1
300 CAPSULE ORAL 4 TIMES DAILY
Qty: 120 CAPSULE | Refills: 1 | Status: CANCELLED | OUTPATIENT
Start: 2021-08-11

## 2021-08-11 RX ORDER — MULTIPLE VITAMINS W/ MINERALS TAB 9MG-400MCG
1 TAB ORAL DAILY
COMMUNITY
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
## 2021-08-12 DIAGNOSIS — M51.27 LUMBOSACRAL DISC HERNIATION: Primary | ICD-10-CM

## 2021-09-02 ENCOUNTER — TELEPHONE (OUTPATIENT)
Dept: INTERNAL MEDICINE | Facility: CLINIC | Age: 46
End: 2021-09-02

## 2021-09-02 NOTE — TELEPHONE ENCOUNTER
Pt states he does not have a medication question at all, just said that to LM for me.  States that Dr Whitaker referred him to Dr Palomino and his ins is requiring a referral from PCP.  It needs to be dated 8/23/21 or before.  Is this something you can do?

## 2021-09-02 NOTE — TELEPHONE ENCOUNTER
PT HAS A QUESTION ABOUT A MEDICATION HE IS TAKING, PT WOULD NOT SAY WHAT PRESCRIPTION OR THE REASON HE HAS A QUESTION

## 2021-09-08 ENCOUNTER — PROCEDURE VISIT (OUTPATIENT)
Dept: NEUROLOGY | Facility: CLINIC | Age: 46
End: 2021-09-08

## 2021-09-08 VITALS — HEART RATE: 80 BPM | OXYGEN SATURATION: 100 % | WEIGHT: 170 LBS | BODY MASS INDEX: 23.8 KG/M2 | HEIGHT: 71 IN

## 2021-09-08 DIAGNOSIS — G43.719 INTRACTABLE CHRONIC MIGRAINE WITHOUT AURA AND WITHOUT STATUS MIGRAINOSUS: Primary | ICD-10-CM

## 2021-09-08 PROCEDURE — 64615 CHEMODENERV MUSC MIGRAINE: CPT | Performed by: PSYCHIATRY & NEUROLOGY

## 2021-09-08 RX ORDER — RIMEGEPANT SULFATE 75 MG/75MG
75 TABLET, ORALLY DISINTEGRATING ORAL EVERY OTHER DAY
Qty: 16 TABLET | Refills: 3 | Status: SHIPPED | OUTPATIENT
Start: 2021-09-08 | End: 2021-10-08

## 2021-09-08 RX ORDER — BUTALBITAL, ACETAMINOPHEN AND CAFFEINE 50; 325; 40 MG/1; MG/1; MG/1
1 TABLET ORAL EVERY 4 HOURS PRN
Qty: 30 TABLET | Refills: 3 | Status: SHIPPED | OUTPATIENT
Start: 2021-09-08 | End: 2021-12-27

## 2021-09-08 NOTE — PROGRESS NOTES
CC:  Migraines.    HPI : Patient is in clinic for  Botox injection.  Current headache frequency is approximately 5-6 headache days in a month with some headaches lasting for more than 4 hours.      Botox Procedure Note    Current headache frequency: _5-6_ headaches days / month.    The risks and benefits were discussed with the patient. The patient was given the opportunity to ask questions. Informed consent form was signed.    Onabotulinumtoxin A was reconstituted with 0.9% normal saline. All injections sites were prepped with alcohol swab. Approximately 5 units of botox were injected into each of the following sites bilaterally as per routine migraine botox injection PREEMPT protocol: , procerus, frontalis, temporalis, occipitalis, upper cervical paraspinals, and trapezius.    The patient tolerated the procedure well. There were no immediate complications. The patient will follow up in approximately 12 weeks for her next injection.    Total amount of onabotulinumtoxin A injected was 155 units with 45 units wasted.    Additional notes: Overall migraine frequency and intensity remains under good control however he is reporting breakthrough migraines for 1 week prior to his Aimovig injection and sometimes 1 week prior to Botox.  I am going to start him on Nurtec 75 mg to be taken every other day during those weeks when the breakthrough migraines are worse.  Otherwise continue with Aimovig monthly injection and Botox every 12 weeks for migraine prophylaxis.  I will see him back in 12 weeks for next injection.

## 2021-09-13 RX ORDER — UBROGEPANT 100 MG/1
TABLET ORAL
Qty: 10 TABLET | Refills: 6 | Status: SHIPPED | OUTPATIENT
Start: 2021-09-13 | End: 2022-04-29

## 2021-09-14 ENCOUNTER — OFFICE VISIT (OUTPATIENT)
Dept: INTERNAL MEDICINE | Facility: CLINIC | Age: 46
End: 2021-09-14

## 2021-09-14 VITALS
SYSTOLIC BLOOD PRESSURE: 142 MMHG | HEART RATE: 80 BPM | OXYGEN SATURATION: 98 % | WEIGHT: 171.4 LBS | HEIGHT: 71 IN | DIASTOLIC BLOOD PRESSURE: 86 MMHG | BODY MASS INDEX: 24 KG/M2

## 2021-09-14 DIAGNOSIS — L98.9 SKIN LESION OF FACE: ICD-10-CM

## 2021-09-14 DIAGNOSIS — Z23 NEED FOR 23-POLYVALENT PNEUMOCOCCAL POLYSACCHARIDE VACCINE: ICD-10-CM

## 2021-09-14 DIAGNOSIS — M51.27 LUMBOSACRAL DISC HERNIATION: ICD-10-CM

## 2021-09-14 DIAGNOSIS — J01.91 ACUTE RECURRENT SINUSITIS, UNSPECIFIED LOCATION: ICD-10-CM

## 2021-09-14 DIAGNOSIS — M54.17 LUMBOSACRAL RADICULOPATHY: Primary | ICD-10-CM

## 2021-09-14 DIAGNOSIS — Z23 NEED FOR INFLUENZA VACCINATION: ICD-10-CM

## 2021-09-14 PROCEDURE — 90732 PPSV23 VACC 2 YRS+ SUBQ/IM: CPT | Performed by: INTERNAL MEDICINE

## 2021-09-14 PROCEDURE — 90686 IIV4 VACC NO PRSV 0.5 ML IM: CPT | Performed by: INTERNAL MEDICINE

## 2021-09-14 PROCEDURE — 90471 IMMUNIZATION ADMIN: CPT | Performed by: INTERNAL MEDICINE

## 2021-09-14 PROCEDURE — 96372 THER/PROPH/DIAG INJ SC/IM: CPT | Performed by: INTERNAL MEDICINE

## 2021-09-14 PROCEDURE — 90472 IMMUNIZATION ADMIN EACH ADD: CPT | Performed by: INTERNAL MEDICINE

## 2021-09-14 PROCEDURE — 99214 OFFICE O/P EST MOD 30 MIN: CPT | Performed by: INTERNAL MEDICINE

## 2021-09-14 RX ORDER — MULTIVITAMIN WITH IRON
100 TABLET ORAL DAILY
Qty: 90 TABLET | Refills: 3 | Status: SHIPPED | OUTPATIENT
Start: 2021-09-14 | End: 2022-11-22

## 2021-09-14 RX ORDER — DEXAMETHASONE SODIUM PHOSPHATE 4 MG/ML
4 INJECTION, SOLUTION INTRA-ARTICULAR; INTRALESIONAL; INTRAMUSCULAR; INTRAVENOUS; SOFT TISSUE ONCE
Status: COMPLETED | OUTPATIENT
Start: 2021-09-14 | End: 2021-09-14

## 2021-09-14 RX ORDER — ONABOTULINUMTOXINA 200 [USP'U]/1
INJECTION, POWDER, LYOPHILIZED, FOR SOLUTION INTRADERMAL; INTRAMUSCULAR
COMMUNITY
Start: 2021-09-13 | End: 2022-02-10

## 2021-09-14 RX ORDER — LEVOFLOXACIN 500 MG/1
500 TABLET, FILM COATED ORAL DAILY
Qty: 10 TABLET | Refills: 0 | Status: SHIPPED | OUTPATIENT
Start: 2021-09-14 | End: 2022-03-16

## 2021-09-14 RX ORDER — BUDESONIDE 0.5 MG/2ML
0.5 INHALANT ORAL
Qty: 30 EACH | Refills: 3 | Status: SHIPPED | OUTPATIENT
Start: 2021-09-14 | End: 2022-03-14

## 2021-09-14 RX ADMIN — DEXAMETHASONE SODIUM PHOSPHATE 4 MG: 4 INJECTION, SOLUTION INTRA-ARTICULAR; INTRALESIONAL; INTRAMUSCULAR; INTRAVENOUS; SOFT TISSUE at 14:55

## 2021-09-14 NOTE — PROGRESS NOTES
Sinusitis (for about 3 months, would like ENT referral)    Subjective   Carson Butler III is a 45 y.o. male is here today for follow-up.    History of Present Illness   His back issues resolved with the passing of his kidney stones.   His sinuses are bad, congested.  Had it x 2 months, s/p augmentin and aug 29th went to ER for kidney stones  And given omnicef.    Answers for HPI/ROS submitted by the patient on 9/14/2021  What is the primary reason for your visit?: Other  Please describe your symptoms.: Sinus infection  Have you had these symptoms before?: Yes  How long have you been having these symptoms?: Greater than 2 weeks        Current Outpatient Medications:   •  Aimovig 140 MG/ML prefilled syringe, INJECT 1ML(140MG) UNDER THE SKIN INTO THE APPROPRIATE AREA AS DIRECTED EVERY 30 DAYS, Disp: 1 mL, Rfl: 11  •  butalbital-acetaminophen-caffeine (FIORICET, ESGIC) -40 MG per tablet, Take 1 tablet by mouth Every 4 (Four) Hours As Needed for Headache., Disp: 30 tablet, Rfl: 3  •  Cholecalciferol (VITAMIN D) 2000 UNITS capsule, Take  by mouth., Disp: , Rfl:   •  eletriptan (RELPAX) 40 MG tablet, TAKE 1 TABLET BY MOUTH AS NEEDED FOR MIGRAINE, Disp: 9 tablet, Rfl: 6  •  gabapentin (NEURONTIN) 300 MG capsule, Take 1 capsule by mouth 4 (Four) Times a Day., Disp: 120 capsule, Rfl: 1  •  methylphenidate (RITALIN) 20 MG tablet, Take 20 mg by mouth Daily. 20mg twice daily, Disp: , Rfl:   •  methylphenidate 27 MG CR tablet, Take 27 mg by mouth Daily, Disp: , Rfl:   •  minocycline (MINOCIN,DYNACIN) 50 MG capsule, TAKE 2 CAPSULES BY MOUTH DAILY, Disp: 180 capsule, Rfl: 1  •  multivitamin with minerals (MULTIVITAMIN ADULT PO), Take 1 tablet by mouth Daily., Disp: , Rfl:   •  propranolol (INDERAL) 10 MG tablet, Take 10 mg by mouth Every Morning., Disp: , Rfl:   •  propranolol (INDERAL) 20 MG tablet, Take 30 mg by mouth 2 (two) times a day. Patient takes 20 mg QHS and 10 mg am, Disp: , Rfl:   •  Pseudoephedrine HCl ER  "240 MG tablet sustained-release 24 hour, Take one tablet by mouth daily as needed, Disp: 90 each, Rfl: 1  •  psyllium (METAMUCIL) 58.6 % packet, Take 1 packet by mouth Daily., Disp: , Rfl:   •  Rimegepant Sulfate (Nurtec) 75 MG tablet dispersible tablet, Take 1 tablet by mouth Every Other Day for 30 days., Disp: 16 tablet, Rfl: 3  •  traZODone (DESYREL) 100 MG tablet, TAKE 1 TABLET BY MOUTH EVERY NIGHT, Disp: 90 tablet, Rfl: 1  •  ubrogepant 100 MG tablet, TAKE 1 TABLET BY MOUTH AS NEEDED FOR MIGRAINE, Disp: 10 tablet, Rfl: 6  •  vitamin B-6 (PYRIDOXINE) 100 MG tablet, Take 1 tablet by mouth Daily., Disp: 90 tablet, Rfl: 3  •  Botox 200 units reconstituted solution, , Disp: , Rfl:   •  budesonide (PULMICORT) 0.5 MG/2ML nebulizer solution, Take 2 mL by nebulization Daily., Disp: 30 each, Rfl: 3  •  levoFLOXacin (Levaquin) 500 MG tablet, Take 1 tablet by mouth Daily., Disp: 10 tablet, Rfl: 0      The following portions of the patient's history were reviewed and updated as appropriate: allergies, current medications, past family history, past medical history, past social history, past surgical history and problem list.    Review of Systems   Constitutional: Negative.  Negative for chills and fever.   HENT: Positive for ear pain, sinus pressure and sinus pain. Negative for ear discharge and sore throat.    Respiratory: Negative for cough, chest tightness and shortness of breath.    Cardiovascular: Negative for chest pain, palpitations and leg swelling.   Gastrointestinal: Negative for diarrhea, nausea and vomiting.   Musculoskeletal: Positive for back pain. Negative for arthralgias and myalgias.   Neurological: Negative for dizziness, syncope and headaches.   Psychiatric/Behavioral: Negative for confusion and sleep disturbance.       Objective   /86   Pulse 80   Ht 180.3 cm (71\")   Wt 77.7 kg (171 lb 6.4 oz)   SpO2 98% Comment: ra  BMI 23.91 kg/m²   Physical Exam  Vitals and nursing note reviewed. "   Constitutional:       Appearance: He is well-developed.   HENT:      Head: Normocephalic and atraumatic.      Right Ear: External ear normal. Tympanic membrane is bulging.      Left Ear: External ear normal. Tympanic membrane is bulging.      Mouth/Throat:      Pharynx: Posterior oropharyngeal erythema present. No oropharyngeal exudate.      Tonsils: No tonsillar abscesses.   Eyes:      Conjunctiva/sclera: Conjunctivae normal.      Pupils: Pupils are equal, round, and reactive to light.   Neck:      Thyroid: No thyromegaly.   Cardiovascular:      Rate and Rhythm: Normal rate and regular rhythm.      Pulses: Normal pulses.      Heart sounds: Normal heart sounds. No murmur heard.   No friction rub. No gallop.    Pulmonary:      Effort: Pulmonary effort is normal.      Breath sounds: Normal breath sounds. No stridor.   Abdominal:      General: Bowel sounds are normal. There is no distension.      Palpations: Abdomen is soft.      Tenderness: There is no abdominal tenderness.   Musculoskeletal:         General: Tenderness present.      Cervical back: Normal range of motion and neck supple.   Lymphadenopathy:      Cervical: No cervical adenopathy.   Skin:     General: Skin is warm and dry.   Neurological:      Mental Status: He is alert and oriented to person, place, and time.      Cranial Nerves: No cranial nerve deficit.   Psychiatric:         Judgment: Judgment normal.           Results for orders placed or performed in visit on 06/02/21   Tissue Pathology Exam    Specimen: Large Intestine, Right / Ascending Colon; Tissue   Result Value Ref Range    Case Report       Surgical Pathology Report                         Case: [id number]                                  Authorizing Provider:  Wai Lopez MD    Collected:           06/02/2021 02:23 PM          Ordering Location:     [facility]   Received:            06/03/2021 08:28 AM                                 LABORATORY                        "                                            Pathologist:           Mike Smith MD                                                           Specimen:    Large Intestine, Right / Ascending Colon                                                   Clinical Information       Screening colonoscopy, diverticulosis, fair prep      Final Diagnosis       ASCENDING COLON POLYP BIOPSIES:                 Benign mucosal polyp                 Negative for adenomatous change and serrated architecture      Gross Description          The specimen is received in formalin labeled \"ascending colon polyp\" and consists of at least 2 pieces of tan soft tissue aggregating 0.3 x 0.3 x 0.2 cm submitted entirely in a single cassette.  HM      Microscopic Description       The slides are reviewed and demonstrate histopathologic features supporting the above rendered diagnosis.                 Assessment/Plan   Diagnoses and all orders for this visit:    Lumbosacral radiculopathy  -     Ambulatory Referral to Spine Surgery    Acute recurrent sinusitis, unspecified location  -     Ambulatory Referral to ENT (Otolaryngology)  -     budesonide (PULMICORT) 0.5 MG/2ML nebulizer solution; Take 2 mL by nebulization Daily.  -     levoFLOXacin (Levaquin) 500 MG tablet; Take 1 tablet by mouth Daily.  -     dexamethasone (DECADRON) injection 4 mg    Skin lesion of face  -     Ambulatory Referral to Dermatology    Need for 23-polyvalent pneumococcal polysaccharide vaccine  -     Pneumococcal Polysaccharide Vaccine 23-Valent (PPSV23) Greater Than or Equal To 3yo Subcutaneous / IM    Lumbosacral disc herniation  -     vitamin B-6 (PYRIDOXINE) 100 MG tablet; Take 1 tablet by mouth Daily.    Need for influenza vaccination  -     FluLaval >6 Months    Other orders  -     Botox 200 units reconstituted solution           Return for Next scheduled follow up.    Electronically signed by:    Felisa Goodson MD   "

## 2021-09-20 DIAGNOSIS — G43.719 INTRACTABLE CHRONIC MIGRAINE WITHOUT AURA AND WITHOUT STATUS MIGRAINOSUS: ICD-10-CM

## 2021-09-20 RX ORDER — ELETRIPTAN HYDROBROMIDE 40 MG/1
TABLET, FILM COATED ORAL
Qty: 9 TABLET | Refills: 6 | Status: SHIPPED | OUTPATIENT
Start: 2021-09-20

## 2021-09-21 DIAGNOSIS — M54.16 LUMBAR RADICULOPATHY: ICD-10-CM

## 2021-09-21 DIAGNOSIS — M51.27 LUMBOSACRAL DISC HERNIATION: ICD-10-CM

## 2021-09-21 RX ORDER — MULTIVITAMIN WITH IRON
100 TABLET ORAL DAILY
Qty: 90 TABLET | Refills: 3 | OUTPATIENT
Start: 2021-09-21

## 2021-09-21 RX ORDER — GABAPENTIN 300 MG/1
300 CAPSULE ORAL 4 TIMES DAILY
Qty: 120 CAPSULE | Refills: 1 | Status: SHIPPED | OUTPATIENT
Start: 2021-09-21 | End: 2021-12-16

## 2021-09-21 RX ORDER — MULTIVITAMIN WITH IRON
TABLET ORAL
Qty: 90 TABLET | Refills: 3 | OUTPATIENT
Start: 2021-09-21

## 2021-10-09 DIAGNOSIS — J01.00 SUBACUTE MAXILLARY SINUSITIS: ICD-10-CM

## 2021-10-13 DIAGNOSIS — J01.00 SUBACUTE MAXILLARY SINUSITIS: ICD-10-CM

## 2021-10-13 NOTE — TELEPHONE ENCOUNTER
Pharmacy Name: [id number] - [place], [place] - [address]  AT [address] [facility] [address] [facility] - [phone]  - [phone]      Pharmacy representative name:FABIANO     Pharmacy representative phone number: [phone]    What medication are you calling in regards to: Pseudoephedrine HCl  MG tablet sustained-release 24 hour    What question does the pharmacy have: MEDICATION HAS BEEN DISCONTINUED    Who is the provider that prescribed the medication: ALANNA     Additional notes:

## 2021-10-14 ENCOUNTER — TELEPHONE (OUTPATIENT)
Dept: INTERNAL MEDICINE | Facility: CLINIC | Age: 46
End: 2021-10-14

## 2021-10-14 DIAGNOSIS — J01.00 SUBACUTE MAXILLARY SINUSITIS: ICD-10-CM

## 2021-10-14 NOTE — TELEPHONE ENCOUNTER
Jazlyn spoke with bashir and gave the verbal order for a refill due to patient having one left but the rx was closed out on accident.

## 2021-10-14 NOTE — TELEPHONE ENCOUNTER
DEBBIE WITH [facility]    PT WANTS TO HAVE HIS SECOND REFILL FOR PSEUDOEPHEDRINE , HOWEVER THE PRESCRIPTION IS CLOSED IN THE [facility].  SHE NEEDS A NEW RX OR A VERBAL ORDERTO REFILL THE MEDICATION FOR THE PATIENT    PLEASE ADVISE [phone]

## 2021-12-01 ENCOUNTER — PROCEDURE VISIT (OUTPATIENT)
Dept: NEUROLOGY | Facility: CLINIC | Age: 46
End: 2021-12-01

## 2021-12-01 VITALS — BODY MASS INDEX: 23.1 KG/M2 | HEIGHT: 71 IN | OXYGEN SATURATION: 97 % | WEIGHT: 165 LBS | HEART RATE: 77 BPM

## 2021-12-01 DIAGNOSIS — G43.719 INTRACTABLE CHRONIC MIGRAINE WITHOUT AURA AND WITHOUT STATUS MIGRAINOSUS: Primary | ICD-10-CM

## 2021-12-01 PROCEDURE — 64615 CHEMODENERV MUSC MIGRAINE: CPT | Performed by: PSYCHIATRY & NEUROLOGY

## 2021-12-01 RX ORDER — RIMEGEPANT SULFATE 75 MG/75MG
TABLET, ORALLY DISINTEGRATING ORAL
COMMUNITY
Start: 2021-10-27 | End: 2022-02-23 | Stop reason: SDUPTHER

## 2021-12-01 NOTE — PROGRESS NOTES
CC:  Migraines.    HPI : Patient is in clinic for Botox injection.  Current headache frequency is approximately 5-6 headache days in a month with some headaches lasting for more than 4 hours.      Botox Procedure Note    Current headache frequency: _5-6_ headaches days / month.    The risks and benefits were discussed with the patient. The patient was given the opportunity to ask questions. Informed consent form was signed.    Onabotulinumtoxin A was reconstituted with 0.9% normal saline. All injections sites were prepped with alcohol swab. Approximately 5 units of botox were injected into each of the following sites bilaterally as per routine migraine botox injection PREEMPT protocol: , procerus, frontalis, temporalis, occipitalis, upper cervical paraspinals, and trapezius.    The patient tolerated the procedure well. There were no immediate complications. The patient will follow up in approximately 12 weeks for her next injection.    Total amount of onabotulinumtoxin A injected was 155 units with 45 units wasted.    Note: He is taking

## 2021-12-16 DIAGNOSIS — F51.01 PRIMARY INSOMNIA: ICD-10-CM

## 2021-12-16 DIAGNOSIS — M54.16 LUMBAR RADICULOPATHY: ICD-10-CM

## 2021-12-16 RX ORDER — GABAPENTIN 300 MG/1
CAPSULE ORAL
Qty: 120 CAPSULE | Refills: 0 | Status: SHIPPED | OUTPATIENT
Start: 2021-12-16 | End: 2022-03-16

## 2021-12-16 RX ORDER — TRAZODONE HYDROCHLORIDE 100 MG/1
TABLET ORAL
Qty: 90 TABLET | Refills: 1 | Status: SHIPPED | OUTPATIENT
Start: 2021-12-16 | End: 2022-03-16 | Stop reason: SDUPTHER

## 2021-12-23 DIAGNOSIS — G43.719 INTRACTABLE CHRONIC MIGRAINE WITHOUT AURA AND WITHOUT STATUS MIGRAINOSUS: ICD-10-CM

## 2021-12-23 RX ORDER — ERENUMAB-AOOE 140 MG/ML
INJECTION, SOLUTION SUBCUTANEOUS
Qty: 1 ML | Refills: 11 | Status: SHIPPED | OUTPATIENT
Start: 2021-12-23 | End: 2022-02-28

## 2021-12-24 DIAGNOSIS — G43.719 INTRACTABLE CHRONIC MIGRAINE WITHOUT AURA AND WITHOUT STATUS MIGRAINOSUS: ICD-10-CM

## 2021-12-27 RX ORDER — BUTALBITAL, ACETAMINOPHEN AND CAFFEINE 50; 325; 40 MG/1; MG/1; MG/1
TABLET ORAL
Qty: 30 TABLET | Refills: 2 | Status: SHIPPED | OUTPATIENT
Start: 2021-12-27 | End: 2022-04-13

## 2022-01-15 DIAGNOSIS — J01.00 SUBACUTE MAXILLARY SINUSITIS: ICD-10-CM

## 2022-01-15 RX ORDER — MINOCYCLINE HYDROCHLORIDE 50 MG/1
100 CAPSULE ORAL DAILY
Qty: 60 CAPSULE | OUTPATIENT
Start: 2022-01-15

## 2022-02-10 ENCOUNTER — TELEPHONE (OUTPATIENT)
Dept: NEUROLOGY | Facility: CLINIC | Age: 47
End: 2022-02-10

## 2022-02-23 ENCOUNTER — PROCEDURE VISIT (OUTPATIENT)
Dept: NEUROLOGY | Facility: CLINIC | Age: 47
End: 2022-02-23

## 2022-02-23 VITALS — HEIGHT: 71 IN | BODY MASS INDEX: 23.1 KG/M2 | WEIGHT: 165 LBS

## 2022-02-23 DIAGNOSIS — G43.719 INTRACTABLE CHRONIC MIGRAINE WITHOUT AURA AND WITHOUT STATUS MIGRAINOSUS: Primary | ICD-10-CM

## 2022-02-23 PROCEDURE — 64615 CHEMODENERV MUSC MIGRAINE: CPT | Performed by: PSYCHIATRY & NEUROLOGY

## 2022-02-23 RX ORDER — RIMEGEPANT SULFATE 75 MG/75MG
75 TABLET, ORALLY DISINTEGRATING ORAL EVERY OTHER DAY
Qty: 16 TABLET | Refills: 6 | Status: SHIPPED | OUTPATIENT
Start: 2022-02-23 | End: 2022-09-27

## 2022-02-23 NOTE — PROGRESS NOTES
CC:  Migraines.    HPI : Patient is in clinic for Botox injection.  Current headache frequency is approximately 5-6 headache days in a month with some headaches lasting for more than 4 hours.      Botox Procedure Note    Current headache frequency: _5-6_ headaches days / month.    The risks and benefits were discussed with the patient. The patient was given the opportunity to ask questions. Informed consent form was signed.    Onabotulinumtoxin A was reconstituted with 0.9% normal saline. All injections sites were prepped with alcohol swab. Approximately 5 units of botox were injected into each of the following sites bilaterally as per routine migraine botox injection PREEMPT protocol: , procerus, frontalis, temporalis, occipitalis, upper cervical paraspinals, and trapezius.    The patient tolerated the procedure well. There were no immediate complications. The patient will follow up in approximately 12 weeks for her next injection.    Total amount of onabotulinumtoxin A injected was 155 units with 45 units wasted.

## 2022-02-28 RX ORDER — FREMANEZUMAB-VFRM 225 MG/1.5ML
225 INJECTION SUBCUTANEOUS
Qty: 1.5 ML | Refills: 11 | Status: SHIPPED | OUTPATIENT
Start: 2022-02-28 | End: 2022-11-22

## 2022-03-14 DIAGNOSIS — J01.91 ACUTE RECURRENT SINUSITIS, UNSPECIFIED LOCATION: ICD-10-CM

## 2022-03-14 RX ORDER — BUDESONIDE 0.5 MG/2ML
INHALANT ORAL
Qty: 60 ML | Refills: 1 | Status: SHIPPED | OUTPATIENT
Start: 2022-03-14 | End: 2022-03-14

## 2022-03-14 RX ORDER — BUDESONIDE 0.5 MG/2ML
INHALANT ORAL
Qty: 180 ML | Refills: 0 | Status: SHIPPED | OUTPATIENT
Start: 2022-03-14 | End: 2022-03-16 | Stop reason: SDUPTHER

## 2022-03-16 ENCOUNTER — OFFICE VISIT (OUTPATIENT)
Dept: INTERNAL MEDICINE | Facility: CLINIC | Age: 47
End: 2022-03-16

## 2022-03-16 VITALS
SYSTOLIC BLOOD PRESSURE: 122 MMHG | HEIGHT: 71 IN | BODY MASS INDEX: 23.24 KG/M2 | OXYGEN SATURATION: 100 % | WEIGHT: 166 LBS | DIASTOLIC BLOOD PRESSURE: 78 MMHG | HEART RATE: 91 BPM

## 2022-03-16 DIAGNOSIS — J01.91 ACUTE RECURRENT SINUSITIS, UNSPECIFIED LOCATION: ICD-10-CM

## 2022-03-16 DIAGNOSIS — F51.01 PRIMARY INSOMNIA: ICD-10-CM

## 2022-03-16 DIAGNOSIS — L70.8 OTHER ACNE: ICD-10-CM

## 2022-03-16 PROCEDURE — 99213 OFFICE O/P EST LOW 20 MIN: CPT | Performed by: INTERNAL MEDICINE

## 2022-03-16 RX ORDER — BUDESONIDE 0.5 MG/2ML
0.5 INHALANT ORAL
Qty: 180 ML | Refills: 1 | Status: SHIPPED | OUTPATIENT
Start: 2022-03-16 | End: 2022-09-07

## 2022-03-16 RX ORDER — TRAZODONE HYDROCHLORIDE 100 MG/1
100 TABLET ORAL
Qty: 90 TABLET | Refills: 1 | Status: SHIPPED | OUTPATIENT
Start: 2022-03-16 | End: 2023-03-13

## 2022-03-16 RX ORDER — MINOCYCLINE HYDROCHLORIDE 50 MG/1
100 CAPSULE ORAL DAILY
Qty: 180 CAPSULE | Refills: 1 | Status: SHIPPED | OUTPATIENT
Start: 2022-03-16 | End: 2022-03-16 | Stop reason: SDUPTHER

## 2022-03-16 RX ORDER — MINOCYCLINE HYDROCHLORIDE 50 MG/1
100 CAPSULE ORAL DAILY
Qty: 180 CAPSULE | Refills: 3 | Status: SHIPPED | OUTPATIENT
Start: 2022-03-16 | End: 2022-11-22

## 2022-03-16 RX ORDER — BUDESONIDE 0.5 MG/2ML
0.5 INHALANT ORAL
Qty: 180 ML | Refills: 1 | Status: SHIPPED | OUTPATIENT
Start: 2022-03-16 | End: 2022-03-16 | Stop reason: SDUPTHER

## 2022-03-16 RX ORDER — TRAZODONE HYDROCHLORIDE 100 MG/1
100 TABLET ORAL
Qty: 90 TABLET | Refills: 1 | Status: SHIPPED | OUTPATIENT
Start: 2022-03-16 | End: 2022-03-16 | Stop reason: SDUPTHER

## 2022-03-16 NOTE — PROGRESS NOTES
Hyperlipidemia and Acne    Subjective   Carson Butler III is a 46 y.o. male is here today for follow-up.    History of Present Illness   Carson is here for a follow up on his HLP and meds.  His minocycline was denied.    Answers for HPI/ROS submitted by the patient on 3/16/2022  What is the primary reason for your visit?: Other  Please describe your symptoms.: No symptoms. Follow up?  Have you had these symptoms before?: No  How long have you been having these symptoms?: 1-4 days  Please list any medications you are currently taking for this condition.: .  Please describe any probable cause for these symptoms. : .        Current Outpatient Medications:   •  budesonide (PULMICORT) 0.5 MG/2ML nebulizer solution, Take 2 mL by nebulization Daily., Disp: 180 mL, Rfl: 1  •  minocycline (MINOCIN,DYNACIN) 50 MG capsule, Take 2 capsules by mouth Daily., Disp: 180 capsule, Rfl: 3  •  traZODone (DESYREL) 100 MG tablet, Take 1 tablet by mouth every night at bedtime., Disp: 90 tablet, Rfl: 1  •  butalbital-acetaminophen-caffeine (FIORICET, ESGIC) -40 MG per tablet, TAKE ONE TABLET BY MOUTH EVERY 4 HOURS AS NEEDED FOR HEADACHE, Disp: 30 tablet, Rfl: 2  •  Cholecalciferol (VITAMIN D) 2000 UNITS capsule, Take  by mouth., Disp: , Rfl:   •  eletriptan (RELPAX) 40 MG tablet, TAKE 1 TABLET BY MOUTH AS NEEDED FOR MIGRAINE, Disp: 9 tablet, Rfl: 6  •  Fremanezumab-vfrm (Ajovy) 225 MG/1.5ML solution auto-injector, Inject 225 mg under the skin into the appropriate area as directed Every 30 (Thirty) Days., Disp: 1.5 mL, Rfl: 11  •  methylphenidate (RITALIN) 20 MG tablet, Take 20 mg by mouth Daily. 20mg twice daily, Disp: , Rfl:   •  methylphenidate 27 MG CR tablet, Take 27 mg by mouth Daily, Disp: , Rfl:   •  multivitamin with minerals (MULTIVITAMIN ADULT PO), Take 1 tablet by mouth Daily., Disp: , Rfl:   •  Nurtec 75 MG dispersible tablet, Take 1 tablet by mouth Every Other Day., Disp: 16 tablet, Rfl: 6  •  OnabotulinumtoxinA  "200 units reconstituted solution, Inject 155 units IM into head neck shoulders every 12 weeks per FDA protocol Dx G43.719, Disp: 1 each, Rfl: 3  •  propranolol (INDERAL) 10 MG tablet, Take 10 mg by mouth Every Morning., Disp: , Rfl:   •  propranolol (INDERAL) 20 MG tablet, Take 30 mg by mouth 2 (two) times a day. Patient takes 20 mg QHS and 10 mg am, Disp: , Rfl:   •  Pseudoephedrine HCl  MG tablet sustained-release 24 hour, Take 1 tablet by mouth Daily As Needed (congestion)., Disp: 90 each, Rfl: 1  •  psyllium (METAMUCIL) 58.6 % packet, Take 1 packet by mouth Daily., Disp: , Rfl:   •  ubrogepant 100 MG tablet, TAKE 1 TABLET BY MOUTH AS NEEDED FOR MIGRAINE, Disp: 10 tablet, Rfl: 6  •  vitamin B-6 (PYRIDOXINE) 100 MG tablet, Take 1 tablet by mouth Daily., Disp: 90 tablet, Rfl: 3      The following portions of the patient's history were reviewed and updated as appropriate: allergies, current medications, past family history, past medical history, past social history, past surgical history and problem list.    Review of Systems   Constitutional: Negative.  Negative for chills and fever.   HENT: Positive for congestion and postnasal drip. Negative for ear discharge, ear pain, sinus pressure and sore throat.    Respiratory: Negative for cough, chest tightness and shortness of breath.    Cardiovascular: Negative for chest pain, palpitations and leg swelling.   Gastrointestinal: Negative for diarrhea, nausea and vomiting.   Musculoskeletal: Negative for arthralgias, back pain and myalgias.   Skin: Positive for rash.   Neurological: Negative for dizziness, syncope and headaches.   Psychiatric/Behavioral: Negative for confusion and sleep disturbance.       Objective   /78   Pulse 91   Ht 180.3 cm (71\")   Wt 75.3 kg (166 lb)   SpO2 100%   BMI 23.15 kg/m²   Physical Exam  Constitutional:       Appearance: He is well-developed.   HENT:      Head: Normocephalic and atraumatic.      Right Ear: Tympanic membrane " "is bulging.      Left Ear: Tympanic membrane is bulging.      Mouth/Throat:      Pharynx: Posterior oropharyngeal erythema present. No oropharyngeal exudate.      Tonsils: No tonsillar abscesses.   Eyes:      Pupils: Pupils are equal, round, and reactive to light.   Pulmonary:      Effort: Pulmonary effort is normal.      Breath sounds: Normal breath sounds. No stridor.   Abdominal:      General: Bowel sounds are normal.      Palpations: Abdomen is soft.   Musculoskeletal:      Cervical back: Normal range of motion.   Lymphadenopathy:      Cervical: No cervical adenopathy.   Skin:     General: Skin is warm and dry.   Neurological:      Mental Status: He is alert and oriented to person, place, and time.           Results for orders placed or performed in visit on 06/02/21   Tissue Pathology Exam    Specimen: Large Intestine, Right / Ascending Colon; Tissue   Result Value Ref Range    Case Report       Surgical Pathology Report                         Case: [id number]                                  Authorizing Provider:  Wai Lopez MD    Collected:           06/02/2021 02:23 PM          Ordering Location:     [facility]   Received:            06/03/2021 08:28 AM                                 LABORATORY                                                                   Pathologist:           Mike Smith MD                                                           Specimen:    Large Intestine, Right / Ascending Colon                                                   Clinical Information       Screening colonoscopy, diverticulosis, fair prep      Final Diagnosis       ASCENDING COLON POLYP BIOPSIES:                 Benign mucosal polyp                 Negative for adenomatous change and serrated architecture      Gross Description          The specimen is received in formalin labeled \"ascending colon polyp\" and consists of at least 2 pieces of tan soft tissue aggregating 0.3 x 0.3 x 0.2 cm " submitted entirely in a single cassette.  HM      Microscopic Description       The slides are reviewed and demonstrate histopathologic features supporting the above rendered diagnosis.                 Assessment/Plan   Diagnoses and all orders for this visit:    Acute recurrent sinusitis, unspecified location  -     Discontinue: budesonide (PULMICORT) 0.5 MG/2ML nebulizer solution; Take 2 mL by nebulization Daily.  -     budesonide (PULMICORT) 0.5 MG/2ML nebulizer solution; Take 2 mL by nebulization Daily.    Primary insomnia  -     Discontinue: traZODone (DESYREL) 100 MG tablet; Take 1 tablet by mouth every night at bedtime.  -     traZODone (DESYREL) 100 MG tablet; Take 1 tablet by mouth every night at bedtime.    Other acne  -     Discontinue: minocycline (MINOCIN,DYNACIN) 50 MG capsule; Take 2 capsules by mouth Daily.  -     minocycline (MINOCIN,DYNACIN) 50 MG capsule; Take 2 capsules by mouth Daily.                 Return for please reschedule his physical to May 4th at 2.00 pm, cancel 5/9 appt., Annual.    Electronically signed by:    Felisa Goodson MD

## 2022-04-13 DIAGNOSIS — G43.719 INTRACTABLE CHRONIC MIGRAINE WITHOUT AURA AND WITHOUT STATUS MIGRAINOSUS: ICD-10-CM

## 2022-04-13 RX ORDER — BUTALBITAL, ACETAMINOPHEN AND CAFFEINE 50; 325; 40 MG/1; MG/1; MG/1
TABLET ORAL
Qty: 30 TABLET | Refills: 2 | Status: SHIPPED | OUTPATIENT
Start: 2022-04-13 | End: 2022-07-25

## 2022-04-29 ENCOUNTER — PRIOR AUTHORIZATION (OUTPATIENT)
Dept: NEUROLOGY | Facility: CLINIC | Age: 47
End: 2022-04-29

## 2022-04-29 RX ORDER — UBROGEPANT 100 MG/1
TABLET ORAL
Qty: 10 TABLET | Refills: 6 | Status: SHIPPED | OUTPATIENT
Start: 2022-04-29 | End: 2022-12-09

## 2022-04-29 NOTE — TELEPHONE ENCOUNTER
Rx Refill Note  Requested Prescriptions     Pending Prescriptions Disp Refills   • ubrogepant 100 MG tablet [Pharmacy Med Name: UBRELVY 100MG TABLETS] 10 tablet 6     Sig: TAKE 1 TABLET BY MOUTH AS NEEDED FOR MIGRAINE      Last office visit with prescribing clinician: Visit date not found      Next office visit with prescribing clinician: 5/18/2022            Jerilyn Dolan MA  04/29/22, 15:04 EDT

## 2022-05-04 ENCOUNTER — OFFICE VISIT (OUTPATIENT)
Dept: INTERNAL MEDICINE | Facility: CLINIC | Age: 47
End: 2022-05-04

## 2022-05-04 ENCOUNTER — LAB (OUTPATIENT)
Dept: LAB | Facility: HOSPITAL | Age: 47
End: 2022-05-04

## 2022-05-04 VITALS
DIASTOLIC BLOOD PRESSURE: 80 MMHG | OXYGEN SATURATION: 98 % | WEIGHT: 166.4 LBS | SYSTOLIC BLOOD PRESSURE: 126 MMHG | HEIGHT: 71 IN | HEART RATE: 65 BPM | BODY MASS INDEX: 23.3 KG/M2

## 2022-05-04 DIAGNOSIS — E55.9 VITAMIN D DEFICIENCY: ICD-10-CM

## 2022-05-04 DIAGNOSIS — Z23 NEED FOR COVID-19 VACCINE: ICD-10-CM

## 2022-05-04 DIAGNOSIS — Z12.5 PROSTATE CANCER SCREENING: ICD-10-CM

## 2022-05-04 DIAGNOSIS — Z00.00 ROUTINE GENERAL MEDICAL EXAMINATION AT A HEALTH CARE FACILITY: ICD-10-CM

## 2022-05-04 DIAGNOSIS — Z11.1 SCREENING-PULMONARY TB: ICD-10-CM

## 2022-05-04 DIAGNOSIS — Z00.00 ROUTINE GENERAL MEDICAL EXAMINATION AT A HEALTH CARE FACILITY: Primary | ICD-10-CM

## 2022-05-04 LAB
25(OH)D3 SERPL-MCNC: 54.6 NG/ML (ref 30–100)
BILIRUB BLD-MCNC: NEGATIVE MG/DL
CLARITY, POC: CLEAR
COLOR UR: YELLOW
DEPRECATED RDW RBC AUTO: 42.9 FL (ref 37–54)
ERYTHROCYTE [DISTWIDTH] IN BLOOD BY AUTOMATED COUNT: 13.2 % (ref 12.3–15.4)
EXPIRATION DATE: NORMAL
GLUCOSE UR STRIP-MCNC: NEGATIVE MG/DL
HBA1C MFR BLD: 5.8 % (ref 4.8–5.6)
HCT VFR BLD AUTO: 45.3 % (ref 37.5–51)
HGB BLD-MCNC: 15.3 G/DL (ref 13–17.7)
INDURATION: 0 MM (ref 0–10)
KETONES UR QL: NEGATIVE
LEUKOCYTE EST, POC: NEGATIVE
Lab: NORMAL
Lab: NORMAL
MCH RBC QN AUTO: 30.2 PG (ref 26.6–33)
MCHC RBC AUTO-ENTMCNC: 33.8 G/DL (ref 31.5–35.7)
MCV RBC AUTO: 89.5 FL (ref 79–97)
NITRITE UR-MCNC: NEGATIVE MG/ML
PH UR: 7.5 [PH] (ref 5–8)
PLATELET # BLD AUTO: 402 10*3/MM3 (ref 140–450)
PMV BLD AUTO: 9.4 FL (ref 6–12)
PROT UR STRIP-MCNC: NEGATIVE MG/DL
RBC # BLD AUTO: 5.06 10*6/MM3 (ref 4.14–5.8)
RBC # UR STRIP: NEGATIVE /UL
SP GR UR: 1.01 (ref 1–1.03)
TB SKIN TEST: NEGATIVE
UROBILINOGEN UR QL: NORMAL
WBC NRBC COR # BLD: 7.5 10*3/MM3 (ref 3.4–10.8)

## 2022-05-04 PROCEDURE — 91305 COVID-19 (PFIZER) 12+ YRS: CPT | Performed by: INTERNAL MEDICINE

## 2022-05-04 PROCEDURE — G0103 PSA SCREENING: HCPCS

## 2022-05-04 PROCEDURE — 83036 HEMOGLOBIN GLYCOSYLATED A1C: CPT

## 2022-05-04 PROCEDURE — 99396 PREV VISIT EST AGE 40-64: CPT | Performed by: INTERNAL MEDICINE

## 2022-05-04 PROCEDURE — 0054A COVID-19 (PFIZER) 12+ YRS: CPT | Performed by: INTERNAL MEDICINE

## 2022-05-04 PROCEDURE — 80050 GENERAL HEALTH PANEL: CPT

## 2022-05-04 PROCEDURE — 80061 LIPID PANEL: CPT

## 2022-05-04 PROCEDURE — 82306 VITAMIN D 25 HYDROXY: CPT

## 2022-05-04 PROCEDURE — 81003 URINALYSIS AUTO W/O SCOPE: CPT | Performed by: INTERNAL MEDICINE

## 2022-05-04 PROCEDURE — 86580 TB INTRADERMAL TEST: CPT | Performed by: INTERNAL MEDICINE

## 2022-05-04 NOTE — PROGRESS NOTES
Immunization  Immunization performed in RD by Teri Hernandez MA. Patient tolerated the procedure well without complications.  05/04/22   Teri Hernandez MA

## 2022-05-04 NOTE — PROGRESS NOTES
Chief Complaint   Patient presents with   • Annual Exam       History of Present Illness  HM, Adult Male: The patient is being seen for a health maintenance evaluation. The last health maintenance visit was 1 year(s) ago.   Social History: Household members include spouse. He is , with 1  Daughter-6 yo.  Work status: working full time as [employer], visits [place]. The patient has never smoked cigarettes. He reports never drinking alcohol. He has never used illicit drugs.   General Health: The patient's health is described as good. He has regular dental visits. He denies vision problems. He denies hearing loss. Immunizations status: up to date.   Lifestyle: He consumes a diverse and healthy diet. He does not have any weight concerns. He exercises regularly. He does not use tobacco. He does report to occasional alcohol use. He denies drug use.   Screening: Cancer screening reviewed and current.   Metabolic screening reviewed and current.   Risk screening reviewed and current.     Left plantars wart.  Needs TB test.    Review of Systems   Constitutional: Negative for chills and fatigue.   HENT: Positive for postnasal drip. Negative for congestion, ear pain and sore throat.    Eyes: Negative for pain, redness and visual disturbance.   Respiratory: Negative for cough and shortness of breath.    Cardiovascular: Negative for chest pain, palpitations and leg swelling.   Gastrointestinal: Negative for abdominal pain, diarrhea and nausea.   Endocrine: Negative for cold intolerance and heat intolerance.   Genitourinary: Negative for flank pain and urgency.   Musculoskeletal: Positive for back pain. Negative for arthralgias and gait problem.   Skin: Negative for pallor and rash.   Neurological: Negative for dizziness, weakness and headaches.   Psychiatric/Behavioral: Negative for dysphoric mood and sleep disturbance. The patient is not nervous/anxious.        Patient Active Problem List   Diagnosis   •  Dyslipidemia   • Thyroid nodule   • Vitamin D deficiency   • Left-sided low back pain with left-sided sciatica   • Non-healing skin lesion   • H/O splenectomy   • Intractable migraine without aura and with status migrainosus   • Influenza A   • Lumbosacral disc herniation   • Lumbosacral radiculopathy   • Stenosis of lateral recess of lumbar spine   • Attention deficit hyperactivity disorder (ADHD)   • Insomnia       Social History     Socioeconomic History   • Marital status:    Tobacco Use   • Smoking status: Former Smoker     Packs/day: 0.50     Years: 15.00     Pack years: 7.50     Types: Cigarettes     Start date: 1992     Quit date: 2009     Years since quittin.0   • Smokeless tobacco: Former User   • Tobacco comment: No longer a smoker. Have not smoked in 9 years.   Substance and Sexual Activity   • Alcohol use: Not Currently     Alcohol/week: 4.0 standard drinks     Types: 4 Cans of beer per week     Comment: Casual drinker. Not 4 beers every week.   • Drug use: No   • Sexual activity: Yes     Partners: Female     Birth control/protection: Surgical     Comment: Wife had Hysterectomy       Current Outpatient Medications on File Prior to Visit   Medication Sig Dispense Refill   • budesonide (PULMICORT) 0.5 MG/2ML nebulizer solution Take 2 mL by nebulization Daily. 180 mL 1   • butalbital-acetaminophen-caffeine (FIORICET, ESGIC) -40 MG per tablet TAKE 1 TABLET BY MOUTH EVERY 4 HOURS AS NEEDED FOR HEADACHE 30 tablet 2   • Cholecalciferol (VITAMIN D) 2000 UNITS capsule Take  by mouth.     • eletriptan (RELPAX) 40 MG tablet TAKE 1 TABLET BY MOUTH AS NEEDED FOR MIGRAINE 9 tablet 6   • Fremanezumab-vfrm (Ajovy) 225 MG/1.5ML solution auto-injector Inject 225 mg under the skin into the appropriate area as directed Every 30 (Thirty) Days. 1.5 mL 11   • methylphenidate (RITALIN) 20 MG tablet Take 20 mg by mouth Daily. 20mg twice daily     • methylphenidate 27 MG CR tablet Take 27 mg by mouth  "Daily     • minocycline (MINOCIN,DYNACIN) 50 MG capsule Take 2 capsules by mouth Daily. 180 capsule 3   • multivitamin with minerals tablet tablet Take 1 tablet by mouth Daily.     • Nurtec 75 MG dispersible tablet Take 1 tablet by mouth Every Other Day. 16 tablet 6   • OnabotulinumtoxinA 200 units reconstituted solution Inject 155 units IM into head neck shoulders every 12 weeks per FDA protocol Dx G43.719 1 each 3   • propranolol (INDERAL) 10 MG tablet Take 10 mg by mouth Every Morning.     • propranolol (INDERAL) 20 MG tablet Take 30 mg by mouth 2 (two) times a day. Patient takes 20 mg QHS and 10 mg am     • Pseudoephedrine HCl  MG tablet sustained-release 24 hour Take 1 tablet by mouth Daily As Needed (congestion). 90 each 1   • psyllium (METAMUCIL) 58.6 % packet Take 1 packet by mouth Daily.     • traZODone (DESYREL) 100 MG tablet Take 1 tablet by mouth every night at bedtime. 90 tablet 1   • ubrogepant 100 MG tablet TAKE 1 TABLET BY MOUTH AS NEEDED FOR MIGRAINE 10 tablet 6   • vitamin B-6 (PYRIDOXINE) 100 MG tablet Take 1 tablet by mouth Daily. 90 tablet 3     No current facility-administered medications on file prior to visit.       Allergies   Allergen Reactions   • Other Itching     Seasonal...grass, pollen       /80   Pulse 65   Ht 180.3 cm (71\")   Wt 75.5 kg (166 lb 6.4 oz)   SpO2 98% Comment: ra  BMI 23.21 kg/m²          The following portions of the patient's history were reviewed and updated as appropriate: allergies, current medications, past family history, past medical history, past social history, past surgical history and problem list.    Physical Exam  Constitutional:       General: He is not in acute distress.     Appearance: Normal appearance.   HENT:      Head: Normocephalic and atraumatic.      Right Ear: Tympanic membrane and external ear normal.      Left Ear: Tympanic membrane and external ear normal.      Nose: Nose normal.      Mouth/Throat:      Mouth: Mucous membranes " are moist.   Eyes:      General: No scleral icterus.  Neck:      Vascular: No carotid bruit.   Cardiovascular:      Rate and Rhythm: Normal rate and regular rhythm.      Pulses: Normal pulses.      Heart sounds: Normal heart sounds. No murmur heard.    No friction rub. No gallop.   Pulmonary:      Effort: Pulmonary effort is normal.      Breath sounds: Normal breath sounds. No rhonchi or rales.   Abdominal:      General: Bowel sounds are normal. There is no distension.      Palpations: Abdomen is soft.      Tenderness: There is no right CVA tenderness, left CVA tenderness, guarding or rebound.      Hernia: No hernia is present.   Musculoskeletal:         General: No tenderness. Normal range of motion.      Cervical back: Normal range of motion.      Right lower leg: No edema.      Left lower leg: No edema.   Lymphadenopathy:      Cervical: No cervical adenopathy.   Skin:     General: Skin is warm.      Findings: No rash.   Neurological:      General: No focal deficit present.      Mental Status: He is alert and oriented to person, place, and time. Mental status is at baseline.      Cranial Nerves: No cranial nerve deficit.      Sensory: No sensory deficit.      Coordination: Coordination normal.      Gait: Gait normal.      Deep Tendon Reflexes: Reflexes normal.   Psychiatric:         Mood and Affect: Mood normal.         Behavior: Behavior normal.         Results for orders placed or performed in visit on 05/04/22   POCT urinalysis dipstick, automated    Specimen: Urine   Result Value Ref Range    Color Yellow Yellow, Straw, Dark Yellow, Julia    Clarity, UA Clear Clear    Specific Gravity  1.010 1.005 - 1.030    pH, Urine 7.5 5.0 - 8.0    Leukocytes Negative Negative    Nitrite, UA Negative Negative    Protein, POC Negative Negative mg/dL    Glucose, UA Negative Negative, 1000 mg/dL (3+) mg/dL    Ketones, UA Negative Negative    Urobilinogen, UA Normal Normal    Bilirubin Negative Negative    Blood, UA Negative  Negative    Lot Number [id number]     Expiration Date 10/21/23    TB Skin Test    Specimen: Blood   Result Value Ref Range    TB Skin Test Negative     Induration 0 0 - 10 mm    Injection Date & Time 5/4/22 3:10PM        Diagnoses and all orders for this visit:    1. Routine general medical examination at a health care facility (Primary)  -     CBC (No Diff); Future  -     Comprehensive Metabolic Panel; Future  -     Hemoglobin A1c; Future  -     Lipid Panel; Future  -     TSH; Future  -     Vitamin D 25 Hydroxy; Future  -     POCT urinalysis dipstick, automated    2. Vitamin D deficiency  -     Vitamin D 25 Hydroxy; Future    3. Prostate cancer screening  -     PSA Screen; Future    4. Screening-pulmonary TB  -     TB Skin Test    5. Need for COVID-19 vaccine  -     COVID-19 Vaccine (Pfizer) Gray Cap        Health Maintenance   Topic Date Due   • ANNUAL PHYSICAL  05/11/2022   • INFLUENZA VACCINE  08/01/2022   • TDAP/TD VACCINES (2 - Td or Tdap) 04/26/2023   • COLORECTAL CANCER SCREENING  06/02/2031   • HEPATITIS C SCREENING  Completed   • COVID-19 Vaccine  Completed   • Pneumococcal Vaccine 0-64  Aged Out       Discussion/Summary  Impression: health maintenance visit, healthy adult male.   Currently, he eats a healthy diet and has an adequate exercise regimen.   Prostate cancer screening: PSA was ordered.   Testicular cancer screening: monthly self testicular exam was advised.   Colorectal cancer screening: fecal occult blood testing is needed every year and colonoscopy is utd .   CT low dose screen- not indicated  Screening lab work includes glucose, lipid profile and 25-hydroxyvitamin D.   The immunizations are up to date.   Advice and education were given regarding cardiovascular risk reduction, healthy dietary habits, Seatbelt and helmet use and self skin examination.        Return in about 6 months (around 11/4/2022) for Annual.    Electronically signed by:    Felisa Goodson MD

## 2022-05-05 LAB
ALBUMIN SERPL-MCNC: 4.4 G/DL (ref 3.5–5.2)
ALBUMIN/GLOB SERPL: 1.7 G/DL
ALP SERPL-CCNC: 76 U/L (ref 39–117)
ALT SERPL W P-5'-P-CCNC: 26 U/L (ref 1–41)
ANION GAP SERPL CALCULATED.3IONS-SCNC: 12 MMOL/L (ref 5–15)
AST SERPL-CCNC: 29 U/L (ref 1–40)
BILIRUB SERPL-MCNC: 0.6 MG/DL (ref 0–1.2)
BUN SERPL-MCNC: 13 MG/DL (ref 6–20)
BUN/CREAT SERPL: 15.9 (ref 7–25)
CALCIUM SPEC-SCNC: 9.4 MG/DL (ref 8.6–10.5)
CHLORIDE SERPL-SCNC: 98 MMOL/L (ref 98–107)
CHOLEST SERPL-MCNC: 236 MG/DL (ref 0–200)
CO2 SERPL-SCNC: 26 MMOL/L (ref 22–29)
CREAT SERPL-MCNC: 0.82 MG/DL (ref 0.76–1.27)
EGFRCR SERPLBLD CKD-EPI 2021: 108.4 ML/MIN/1.73
GLOBULIN UR ELPH-MCNC: 2.6 GM/DL
GLUCOSE SERPL-MCNC: 98 MG/DL (ref 65–99)
HDLC SERPL-MCNC: 41 MG/DL (ref 40–60)
LDLC SERPL CALC-MCNC: 157 MG/DL (ref 0–100)
LDLC/HDLC SERPL: 3.75 {RATIO}
POTASSIUM SERPL-SCNC: 4.3 MMOL/L (ref 3.5–5.2)
PROT SERPL-MCNC: 7 G/DL (ref 6–8.5)
PSA SERPL-MCNC: 0.6 NG/ML (ref 0–4)
SODIUM SERPL-SCNC: 136 MMOL/L (ref 136–145)
TRIGL SERPL-MCNC: 206 MG/DL (ref 0–150)
TSH SERPL DL<=0.05 MIU/L-ACNC: 0.86 UIU/ML (ref 0.27–4.2)
VLDLC SERPL-MCNC: 38 MG/DL (ref 5–40)

## 2022-05-13 ENCOUNTER — PRIOR AUTHORIZATION (OUTPATIENT)
Dept: NEUROLOGY | Facility: CLINIC | Age: 47
End: 2022-05-13

## 2022-05-18 ENCOUNTER — PROCEDURE VISIT (OUTPATIENT)
Dept: NEUROLOGY | Facility: CLINIC | Age: 47
End: 2022-05-18

## 2022-05-18 VITALS
DIASTOLIC BLOOD PRESSURE: 88 MMHG | WEIGHT: 165 LBS | HEART RATE: 87 BPM | RESPIRATION RATE: 12 BRPM | SYSTOLIC BLOOD PRESSURE: 138 MMHG | OXYGEN SATURATION: 99 % | HEIGHT: 71 IN | BODY MASS INDEX: 23.1 KG/M2

## 2022-05-18 DIAGNOSIS — G43.719 INTRACTABLE CHRONIC MIGRAINE WITHOUT AURA AND WITHOUT STATUS MIGRAINOSUS: Primary | ICD-10-CM

## 2022-05-18 PROCEDURE — 64615 CHEMODENERV MUSC MIGRAINE: CPT | Performed by: PSYCHIATRY & NEUROLOGY

## 2022-05-18 NOTE — PROGRESS NOTES
Botox Procedure Note    Current headache frequency: _5-6_ headaches days / month.    The risks and benefits were discussed with the patient. The patient was given the opportunity to ask questions. Informed consent form was signed.    Onabotulinumtoxin A was reconstituted with 0.9% normal saline. All injections sites were prepped with alcohol swab. Approximately 5 units of botox were injected into each of the following sites bilaterally as per routine migraine botox injection PREEMPT protocol: , procerus, frontalis, temporalis, occipitalis, upper cervical paraspinals, and trapezius.    The patient tolerated the procedure well. There were no immediate complications. The patient will follow up in approximately 12 weeks for her next injection.    Total amount of onabotulinumtoxin A injected was 155 units with 45 units wasted.

## 2022-05-19 ENCOUNTER — TELEPHONE (OUTPATIENT)
Dept: NEUROLOGY | Facility: CLINIC | Age: 47
End: 2022-05-19

## 2022-05-19 NOTE — TELEPHONE ENCOUNTER
Provider: ROSCOE  Caller: [facility]ANE[facility]  Relationship to Patient: N/A  Pharmacy: N/A  Phone Number: [phone]  Reason for Call: TELEPHONED RE: NURTE APPEAL DUE TO DENIAL.    IS AN APPEAL IN PROCESS?    PLEASE CALL & ADVISE.    THANK YOU.

## 2022-05-27 ENCOUNTER — PRIOR AUTHORIZATION (OUTPATIENT)
Dept: NEUROLOGY | Facility: CLINIC | Age: 47
End: 2022-05-27

## 2022-05-27 NOTE — TELEPHONE ENCOUNTER
PT JUST CALLED BACK TO TALK TO KIM , I COULD NOT UNDERSTAND ANYTHING HE WAS SAYING AND WE WERE DISCONNECTED .

## 2022-05-27 NOTE — TELEPHONE ENCOUNTER
S/w Patient to confirm that he is still taking Nurtec and Ubrelvy and he states yes he takes both Ubrelvy and Nurtec and gets Botox and Ajovy. He was getting both Ubrelvy and Nurtec through the company (using co pay card) but this is only good the 1st year so now has to go through pharmacy/insurance.     Note: Patient uses wife's insurance but he works 60-80 hours each week!

## 2022-05-31 NOTE — TELEPHONE ENCOUNTER
Will return call once I am finished rooming. I checked the status of his Nurtec appeal and have not yet received a response from his insurance.

## 2022-06-28 RX ORDER — FREMANEZUMAB-VFRM 225 MG/1.5ML
INJECTION SUBCUTANEOUS
Qty: 1.5 ML | Refills: 11 | OUTPATIENT
Start: 2022-06-28

## 2022-06-28 NOTE — TELEPHONE ENCOUNTER
Rx Refill Note  Requested Prescriptions     Pending Prescriptions Disp Refills   • Ajovy 225 MG/1.5ML solution auto-injector [Pharmacy Med Name: AJOVY 225MG/1.5ML PF AUT INJ 1.5ML] 1.5 mL 11     Sig: INJECT 225 MG UNDER THE SKIN INTO APPROPRIATE AREA EVERY 30 DAYS AS DIRECTED      Last filled: 02/28/2022 1.5 with 11 refills.  Last office visit with prescribing clinician: Visit date not found      Next office visit with prescribing clinician: 8/10/2022     Too soon to refill.    Kiah Stone MA  06/28/22, 14:33 EDT

## 2022-07-23 DIAGNOSIS — G43.719 INTRACTABLE CHRONIC MIGRAINE WITHOUT AURA AND WITHOUT STATUS MIGRAINOSUS: ICD-10-CM

## 2022-07-23 DIAGNOSIS — J01.00 SUBACUTE MAXILLARY SINUSITIS: ICD-10-CM

## 2022-07-25 RX ORDER — BUTALBITAL, ACETAMINOPHEN AND CAFFEINE 50; 325; 40 MG/1; MG/1; MG/1
TABLET ORAL
Qty: 30 TABLET | Refills: 1 | Status: SHIPPED | OUTPATIENT
Start: 2022-07-25 | End: 2022-09-27 | Stop reason: SDUPTHER

## 2022-07-27 ENCOUNTER — TELEPHONE (OUTPATIENT)
Dept: NEUROLOGY | Facility: CLINIC | Age: 47
End: 2022-07-27

## 2022-08-02 NOTE — TELEPHONE ENCOUNTER
Provider: GRAZYNA MALHOTRA MD    Caller: MONET    Relationship to Patient: PUJAO    Phone Number: [phone]    Reason for Call: CALLING TO CHECK STATUS OF PA.  STATES THE PA WILL TIME OUT ON Thursday (8-4-22).  APPT IS FOR 8-12-22 FOR BOTOX.  CASE # [id number]     CALLED S/W ARCADIO @ CLINIC AND ADVISED DARLENE IS AT CONSULT ZULY OFFICE .   CALLED S/W CHRISTIE AND ADVISE DARLENE STEPPED AWAY.   UNABLE TO WARM TRANSFER

## 2022-08-12 ENCOUNTER — PROCEDURE VISIT (OUTPATIENT)
Dept: NEUROLOGY | Facility: CLINIC | Age: 47
End: 2022-08-12

## 2022-08-12 DIAGNOSIS — G43.719 INTRACTABLE CHRONIC MIGRAINE WITHOUT AURA AND WITHOUT STATUS MIGRAINOSUS: Primary | ICD-10-CM

## 2022-08-12 PROCEDURE — 64615 CHEMODENERV MUSC MIGRAINE: CPT | Performed by: PSYCHIATRY & NEUROLOGY

## 2022-08-26 ENCOUNTER — PRIOR AUTHORIZATION (OUTPATIENT)
Dept: NEUROLOGY | Facility: CLINIC | Age: 47
End: 2022-08-26

## 2022-09-07 ENCOUNTER — OFFICE VISIT (OUTPATIENT)
Dept: INTERNAL MEDICINE | Facility: CLINIC | Age: 47
End: 2022-09-07

## 2022-09-07 VITALS
HEIGHT: 71 IN | BODY MASS INDEX: 22.26 KG/M2 | HEART RATE: 89 BPM | DIASTOLIC BLOOD PRESSURE: 88 MMHG | SYSTOLIC BLOOD PRESSURE: 140 MMHG | WEIGHT: 159 LBS | TEMPERATURE: 97.5 F | OXYGEN SATURATION: 98 %

## 2022-09-07 DIAGNOSIS — M62.838 CERVICAL PARASPINAL MUSCLE SPASM: ICD-10-CM

## 2022-09-07 DIAGNOSIS — J20.8 ACUTE BRONCHITIS DUE TO OTHER SPECIFIED ORGANISMS: Primary | ICD-10-CM

## 2022-09-07 PROCEDURE — 99213 OFFICE O/P EST LOW 20 MIN: CPT | Performed by: INTERNAL MEDICINE

## 2022-09-07 RX ORDER — PREDNISONE 10 MG/1
TABLET ORAL
Qty: 18 TABLET | Refills: 0 | Status: SHIPPED | OUTPATIENT
Start: 2022-09-07 | End: 2022-11-22

## 2022-09-07 RX ORDER — ALBUTEROL SULFATE 90 UG/1
2 AEROSOL, METERED RESPIRATORY (INHALATION) EVERY 6 HOURS PRN
Qty: 18 G | Refills: 0 | Status: SHIPPED | OUTPATIENT
Start: 2022-09-07 | End: 2022-11-22

## 2022-09-07 RX ORDER — CYCLOBENZAPRINE HCL 5 MG
5 TABLET ORAL NIGHTLY PRN
Qty: 15 TABLET | Refills: 0 | Status: SHIPPED | OUTPATIENT
Start: 2022-09-07 | End: 2022-11-22

## 2022-09-07 RX ORDER — GUAIFENESIN 600 MG/1
600 TABLET, EXTENDED RELEASE ORAL 2 TIMES DAILY
Qty: 30 TABLET | Refills: 0 | Status: SHIPPED | OUTPATIENT
Start: 2022-09-07 | End: 2022-11-22

## 2022-09-07 RX ORDER — CEFDINIR 300 MG/1
300 CAPSULE ORAL 2 TIMES DAILY
Qty: 20 CAPSULE | Refills: 0 | Status: SHIPPED | OUTPATIENT
Start: 2022-09-07 | End: 2022-11-22

## 2022-09-07 NOTE — PROGRESS NOTES
Nasal Congestion and Cough (Dry cough. Going on for a week. )    Subjective   Carson Butler III is a 46 y.o. male is here today for follow-up.    History of Present Illness   URI x 1 week, has a bad cough, with reddish green production.  Occ. Soa.  Neck muscles are tight, after a patient pulled on his neck, when getting an xray.  Has been tested for covid x 4.    Answers for HPI/ROS submitted by the patient on 9/7/2022  What is the primary reason for your visit?: Cough        Current Outpatient Medications:   •  butalbital-acetaminophen-caffeine (FIORICET, ESGIC) -40 MG per tablet, TAKE 1 TABLET BY MOUTH EVERY 4 HOURS AS NEEDED FOR HEADACHE, Disp: 30 tablet, Rfl: 1  •  Cholecalciferol (VITAMIN D) 2000 UNITS capsule, Take  by mouth., Disp: , Rfl:   •  eletriptan (RELPAX) 40 MG tablet, TAKE 1 TABLET BY MOUTH AS NEEDED FOR MIGRAINE, Disp: 9 tablet, Rfl: 6  •  Fremanezumab-vfrm (Ajovy) 225 MG/1.5ML solution auto-injector, Inject 225 mg under the skin into the appropriate area as directed Every 30 (Thirty) Days., Disp: 1.5 mL, Rfl: 11  •  methylphenidate 27 MG CR tablet, Take 27 mg by mouth Daily, Disp: , Rfl:   •  minocycline (MINOCIN,DYNACIN) 50 MG capsule, Take 2 capsules by mouth Daily., Disp: 180 capsule, Rfl: 3  •  multivitamin with minerals tablet tablet, Take 1 tablet by mouth Daily., Disp: , Rfl:   •  Nurtec 75 MG dispersible tablet, Take 1 tablet by mouth Every Other Day., Disp: 16 tablet, Rfl: 6  •  OnabotulinumtoxinA 200 units reconstituted solution, Inject 155 units IM into head neck shoulders every 12 weeks per FDA protocol Dx G43.719, Disp: 1 each, Rfl: 3  •  propranolol (INDERAL) 10 MG tablet, Take 10 mg by mouth Every Morning., Disp: , Rfl:   •  propranolol (INDERAL) 20 MG tablet, Take 30 mg by mouth 2 (two) times a day. Patient takes 20 mg QHS and 10 mg am, Disp: , Rfl:   •  Pseudoephedrine HCl (SUDAFED 24 HOUR PO), TAKE 1 TABLET BY MOUTH EVERY DAY AS NEEDED FOR CONGESTION, Disp: , Rfl:   •  " Pseudoephedrine HCl  MG tablet sustained-release 24 hour, Take 1 tablet by mouth every day as needed for congestion., Disp: 90 each, Rfl: 1  •  psyllium (METAMUCIL) 58.6 % packet, Take 1 packet by mouth Daily., Disp: , Rfl:   •  traZODone (DESYREL) 100 MG tablet, Take 1 tablet by mouth every night at bedtime., Disp: 90 tablet, Rfl: 1  •  ubrogepant 100 MG tablet, TAKE 1 TABLET BY MOUTH AS NEEDED FOR MIGRAINE, Disp: 10 tablet, Rfl: 6  •  vitamin B-6 (PYRIDOXINE) 100 MG tablet, Take 1 tablet by mouth Daily., Disp: 90 tablet, Rfl: 3  •  albuterol sulfate  (90 Base) MCG/ACT inhaler, Inhale 2 puffs Every 6 (Six) Hours As Needed for Wheezing or Shortness of Air., Disp: 18 g, Rfl: 0  •  cefdinir (OMNICEF) 300 MG capsule, Take 1 capsule by mouth 2 (Two) Times a Day., Disp: 20 capsule, Rfl: 0  •  cyclobenzaprine (FLEXERIL) 5 MG tablet, Take 1 tablet by mouth At Night As Needed for Muscle Spasms., Disp: 15 tablet, Rfl: 0  •  guaiFENesin (Mucinex) 600 MG 12 hr tablet, Take 1 tablet by mouth 2 (Two) Times a Day., Disp: 30 tablet, Rfl: 0  •  predniSONE (DELTASONE) 10 MG tablet, Take 3 tabs daily x 3 days then 2 tabs daily x 3 days then 1 tab daily x 3 days then stop., Disp: 18 tablet, Rfl: 0      The following portions of the patient's history were reviewed and updated as appropriate: allergies, current medications, past family history, past medical history, past social history, past surgical history and problem list.    Review of Systems   Constitutional: Negative for chills and fever.   HENT: Positive for postnasal drip and rhinorrhea. Negative for ear pain and sore throat.    Respiratory: Positive for cough, shortness of breath and wheezing.    Cardiovascular: Negative for chest pain.   Musculoskeletal: Positive for myalgias.   Skin: Negative for rash.   Neurological: Positive for headaches.       Objective   /88   Pulse 89   Temp 97.5 °F (36.4 °C)   Ht 180.3 cm (70.98\")   Wt 72.1 kg (159 lb)   SpO2 " 98%   BMI 22.19 kg/m²   Physical Exam  Vitals and nursing note reviewed.   Constitutional:       Appearance: He is well-developed.   HENT:      Head: Normocephalic and atraumatic.      Right Ear: External ear normal. Tympanic membrane is bulging.      Left Ear: External ear normal. Tympanic membrane is bulging.      Mouth/Throat:      Pharynx: Posterior oropharyngeal erythema present. No oropharyngeal exudate.      Tonsils: No tonsillar abscesses.   Eyes:      Conjunctiva/sclera: Conjunctivae normal.      Pupils: Pupils are equal, round, and reactive to light.   Neck:      Thyroid: No thyromegaly.   Cardiovascular:      Rate and Rhythm: Normal rate and regular rhythm.      Pulses: Normal pulses.      Heart sounds: Normal heart sounds. No murmur heard.    No friction rub. No gallop.   Pulmonary:      Effort: Pulmonary effort is normal.      Breath sounds: No stridor. Rhonchi present. No rales.   Abdominal:      General: Bowel sounds are normal. There is no distension.      Palpations: Abdomen is soft.      Tenderness: There is no abdominal tenderness.   Musculoskeletal:      Cervical back: Normal range of motion and neck supple.   Lymphadenopathy:      Cervical: Cervical adenopathy present.   Skin:     General: Skin is warm and dry.   Neurological:      Mental Status: He is alert and oriented to person, place, and time.      Cranial Nerves: No cranial nerve deficit.   Psychiatric:         Judgment: Judgment normal.           Results for orders placed or performed in visit on 05/04/22   POCT urinalysis dipstick, automated    Specimen: Urine   Result Value Ref Range    Color Yellow Yellow, Straw, Dark Yellow, Julia    Clarity, UA Clear Clear    Specific Gravity  1.010 1.005 - 1.030    pH, Urine 7.5 5.0 - 8.0    Leukocytes Negative Negative    Nitrite, UA Negative Negative    Protein, POC Negative Negative mg/dL    Glucose, UA Negative Negative, 1000 mg/dL (3+) mg/dL    Ketones, UA Negative Negative    Urobilinogen, UA  Normal Normal    Bilirubin Negative Negative    Blood, UA Negative Negative    Lot Number [id number]     Expiration Date 10/21/23    TB Skin Test    Specimen: Blood   Result Value Ref Range    TB Skin Test Negative     Induration 0 0 - 10 mm    Injection Date & Time 5/4/22 3:10PM              Assessment & Plan   Diagnoses and all orders for this visit:    Acute bronchitis due to other specified organisms  -     cefdinir (OMNICEF) 300 MG capsule; Take 1 capsule by mouth 2 (Two) Times a Day.  -     guaiFENesin (Mucinex) 600 MG 12 hr tablet; Take 1 tablet by mouth 2 (Two) Times a Day.  -     predniSONE (DELTASONE) 10 MG tablet; Take 3 tabs daily x 3 days then 2 tabs daily x 3 days then 1 tab daily x 3 days then stop.  -     albuterol sulfate  (90 Base) MCG/ACT inhaler; Inhale 2 puffs Every 6 (Six) Hours As Needed for Wheezing or Shortness of Air.    Cervical paraspinal muscle spasm  -     cyclobenzaprine (FLEXERIL) 5 MG tablet; Take 1 tablet by mouth At Night As Needed for Muscle Spasms.    Other orders  -     Pseudoephedrine HCl (SUDAFED 24 HOUR PO); TAKE 1 TABLET BY MOUTH EVERY DAY AS NEEDED FOR CONGESTION                 Return for Next scheduled follow up.    Electronically signed by:    Felisa Goodson MD

## 2022-09-08 DIAGNOSIS — J20.8 ACUTE BRONCHITIS DUE TO OTHER SPECIFIED ORGANISMS: ICD-10-CM

## 2022-09-09 RX ORDER — PREDNISONE 10 MG/1
TABLET ORAL
Qty: 18 TABLET | Refills: 0 | OUTPATIENT
Start: 2022-09-09

## 2022-09-27 ENCOUNTER — TELEPHONE (OUTPATIENT)
Dept: NEUROLOGY | Facility: CLINIC | Age: 47
End: 2022-09-27

## 2022-09-27 DIAGNOSIS — G43.719 INTRACTABLE CHRONIC MIGRAINE WITHOUT AURA AND WITHOUT STATUS MIGRAINOSUS: ICD-10-CM

## 2022-09-27 RX ORDER — BUTALBITAL, ACETAMINOPHEN AND CAFFEINE 50; 325; 40 MG/1; MG/1; MG/1
1 TABLET ORAL EVERY 4 HOURS PRN
Qty: 30 TABLET | Refills: 1 | Status: SHIPPED | OUTPATIENT
Start: 2022-09-27 | End: 2022-12-02

## 2022-09-27 RX ORDER — RIMEGEPANT SULFATE 75 MG/75MG
TABLET, ORALLY DISINTEGRATING ORAL
Qty: 16 TABLET | Refills: 6 | Status: SHIPPED | OUTPATIENT
Start: 2022-09-27 | End: 2022-11-22

## 2022-09-27 NOTE — TELEPHONE ENCOUNTER
MEDICATION CONCERNS    Caller: Carson Butler Chavez WILSON    Relationship: Self    Best call back number: [phone]    Preferred pharmacy: [id number] [address]  AT [facility][1]  & [facility] - [phone] [facility] [phone] FX    What medications are you currently taking:   Current Outpatient Medications on File Prior to Visit   Medication Sig Dispense Refill   • albuterol sulfate  (90 Base) MCG/ACT inhaler Inhale 2 puffs Every 6 (Six) Hours As Needed for Wheezing or Shortness of Air. 18 g 0   • butalbital-acetaminophen-caffeine (FIORICET, ESGIC) -40 MG per tablet TAKE 1 TABLET BY MOUTH EVERY 4 HOURS AS NEEDED FOR HEADACHE 30 tablet 1   • cefdinir (OMNICEF) 300 MG capsule Take 1 capsule by mouth 2 (Two) Times a Day. 20 capsule 0   • Cholecalciferol (VITAMIN D) 2000 UNITS capsule Take  by mouth.     • cyclobenzaprine (FLEXERIL) 5 MG tablet Take 1 tablet by mouth At Night As Needed for Muscle Spasms. 15 tablet 0   • eletriptan (RELPAX) 40 MG tablet TAKE 1 TABLET BY MOUTH AS NEEDED FOR MIGRAINE 9 tablet 6   • Fremanezumab-vfrm (Ajovy) 225 MG/1.5ML solution auto-injector Inject 225 mg under the skin into the appropriate area as directed Every 30 (Thirty) Days. 1.5 mL 11   • guaiFENesin (Mucinex) 600 MG 12 hr tablet Take 1 tablet by mouth 2 (Two) Times a Day. 30 tablet 0   • methylphenidate 27 MG CR tablet Take 27 mg by mouth Daily     • minocycline (MINOCIN,DYNACIN) 50 MG capsule Take 2 capsules by mouth Daily. 180 capsule 3   • multivitamin with minerals tablet tablet Take 1 tablet by mouth Daily.     • Nurtec 75 MG dispersible tablet Take 1 tablet by mouth Every Other Day. 16 tablet 6   • OnabotulinumtoxinA 200 units reconstituted solution Inject 155 units IM into head neck shoulders every 12 weeks per FDA protocol Dx G43.719 1 each 3   • predniSONE (DELTASONE) 10 MG tablet Take 3 tabs daily x 3 days then 2 tabs daily x 3 days then 1 tab daily x 3  days then stop. 18 tablet 0   • propranolol (INDERAL) 10 MG tablet Take 10 mg by mouth Every Morning.     • propranolol (INDERAL) 20 MG tablet Take 30 mg by mouth 2 (two) times a day. Patient takes 20 mg QHS and 10 mg am     • Pseudoephedrine HCl (SUDAFED 24 HOUR PO) TAKE 1 TABLET BY MOUTH EVERY DAY AS NEEDED FOR CONGESTION     • Pseudoephedrine HCl  MG tablet sustained-release 24 hour Take 1 tablet by mouth every day as needed for congestion. 90 each 1   • psyllium (METAMUCIL) 58.6 % packet Take 1 packet by mouth Daily.     • traZODone (DESYREL) 100 MG tablet Take 1 tablet by mouth every night at bedtime. 90 tablet 1   • ubrogepant 100 MG tablet TAKE 1 TABLET BY MOUTH AS NEEDED FOR MIGRAINE 10 tablet 6   • vitamin B-6 (PYRIDOXINE) 100 MG tablet Take 1 tablet by mouth Daily. 90 tablet 3     No current facility-administered medications on file prior to visit.     Which medication are you concerned about: Fremanezumab-vfrm (Ajovy) 225 MG/1.5ML solution auto-injector    Who prescribed you this medication: DR. MALHOTRA    What are your concerns: PT CALLED REQUESTING TO SPEAK WITH CLINICAL STAFF REGARDING CONCERNS OF AJOVY INJECTIONS. PT STATES THAT THE MEDICATION IS NO LONGER WORKING AND WOULD LIKE TO SPEAK W/ DR. MALHOTRA REGARDING OTHER MEDICATION OPTIONS.    PLEASE REVIEW AND ADVISE.

## 2022-09-27 NOTE — TELEPHONE ENCOUNTER
Rx Refill Note  Requested Prescriptions     Pending Prescriptions Disp Refills   • butalbital-acetaminophen-caffeine (FIORICET, ESGIC) -40 MG per tablet 30 tablet 1     Sig: Take 1 tablet by mouth Every 4 (Four) Hours As Needed for Headache.      Last filled:07/25/2022  Last office visit with prescribing clinician: 05/18/2022      Next office visit with prescribing clinician: 11/9/2022     Peggy Junior MA  09/27/22, 16:24 EDT

## 2022-09-27 NOTE — TELEPHONE ENCOUNTER
Called and spoke with pt he said he has tried Aimovig before and it worked really well, but insurance would not pay for it anymore. He said to just go ahead and fill the Ajovy & the Fiorcet/Esgic until I can try to get approval for the Aimovig again.     Thank you

## 2022-09-28 RX ORDER — RIMEGEPANT SULFATE 75 MG/75MG
TABLET, ORALLY DISINTEGRATING ORAL
Qty: 16 TABLET | Refills: 6 | Status: CANCELLED | OUTPATIENT
Start: 2022-09-28

## 2022-11-10 ENCOUNTER — TELEPHONE (OUTPATIENT)
Dept: NEUROLOGY | Facility: CLINIC | Age: 47
End: 2022-11-10

## 2022-11-10 NOTE — TELEPHONE ENCOUNTER
I messaged [facility] and set up delivery for Botox 200 units. They will deliver it 11/11/2022. Urvashi with [facility] called patient and received consent to ship for the life of the RX

## 2022-11-18 ENCOUNTER — PROCEDURE VISIT (OUTPATIENT)
Dept: NEUROLOGY | Facility: CLINIC | Age: 47
End: 2022-11-18

## 2022-11-18 DIAGNOSIS — G43.719 CHRONIC MIGRAINE WITHOUT AURA, INTRACTABLE, WITHOUT STATUS MIGRAINOSUS: ICD-10-CM

## 2022-11-18 DIAGNOSIS — G43.719 INTRACTABLE CHRONIC MIGRAINE WITHOUT AURA AND WITHOUT STATUS MIGRAINOSUS: Primary | ICD-10-CM

## 2022-11-18 PROCEDURE — 64615 CHEMODENERV MUSC MIGRAINE: CPT | Performed by: PSYCHIATRY & NEUROLOGY

## 2022-11-18 NOTE — PROGRESS NOTES
Botox Procedure Note    Current headache frequency: _10_ headaches days / month.    The risks and benefits were discussed with the patient. The patient was given the opportunity to ask questions. Informed consent form was signed.    Onabotulinumtoxin A was reconstituted with 0.9% normal saline. All injections sites were prepped with alcohol swab. Approximately 5 units of botox were injected into each of the following sites bilaterally as per routine migraine botox injection PREEMPT protocol: , procerus, frontalis, temporalis, occipitalis, upper cervical paraspinals, and trapezius.    The patient tolerated the procedure well. There were no immediate complications. The patient will follow up in approximately 12 weeks for her next injection.    Total amount of onabotulinumtoxin A injected was 155 units with 45 units wasted.      Note: He is reporting worsening in migraine frequency and intensity in last 2 months.  He reports that Ajovy is not working well.  Have advised him to stop Ajovy  Going to start him on Qulipta 60 mg daily.  Today in clinic, samples of Qulipta were given.  We will plan to see him back in clinic in 12 weeks for follow-up.

## 2022-11-21 ENCOUNTER — OFFICE VISIT (OUTPATIENT)
Dept: INTERNAL MEDICINE | Facility: CLINIC | Age: 47
End: 2022-11-21

## 2022-11-21 VITALS
TEMPERATURE: 97 F | HEIGHT: 71 IN | DIASTOLIC BLOOD PRESSURE: 90 MMHG | WEIGHT: 158 LBS | OXYGEN SATURATION: 98 % | SYSTOLIC BLOOD PRESSURE: 132 MMHG | BODY MASS INDEX: 22.12 KG/M2 | HEART RATE: 89 BPM

## 2022-11-21 DIAGNOSIS — J01.91 ACUTE RECURRENT SINUSITIS, UNSPECIFIED LOCATION: Primary | ICD-10-CM

## 2022-11-21 DIAGNOSIS — R09.81 NASAL CONGESTION: ICD-10-CM

## 2022-11-21 LAB
EXPIRATION DATE: NORMAL
FLUAV AG UPPER RESP QL IA.RAPID: NOT DETECTED
FLUBV AG UPPER RESP QL IA.RAPID: NOT DETECTED
INTERNAL CONTROL: NORMAL
Lab: NORMAL
SARS-COV-2 AG UPPER RESP QL IA.RAPID: NOT DETECTED

## 2022-11-21 PROCEDURE — 99213 OFFICE O/P EST LOW 20 MIN: CPT | Performed by: NURSE PRACTITIONER

## 2022-11-21 PROCEDURE — 87428 SARSCOV & INF VIR A&B AG IA: CPT | Performed by: NURSE PRACTITIONER

## 2022-11-21 RX ORDER — METHYLPHENIDATE HYDROCHLORIDE 36 MG/1
36 TABLET ORAL DAILY
COMMUNITY
Start: 2022-10-24

## 2022-11-21 RX ORDER — ATOGEPANT 30 MG/1
30 TABLET ORAL
COMMUNITY

## 2022-11-21 RX ORDER — METHYLPHENIDATE HYDROCHLORIDE 20 MG/1
20 TABLET ORAL 2 TIMES DAILY
COMMUNITY
Start: 2022-11-04

## 2022-11-22 RX ORDER — CEFDINIR 300 MG/1
300 CAPSULE ORAL 2 TIMES DAILY
Qty: 14 CAPSULE | Refills: 0 | Status: SHIPPED | OUTPATIENT
Start: 2022-11-22 | End: 2022-11-29

## 2022-11-22 RX ORDER — BENZONATATE 100 MG/1
100 CAPSULE ORAL 3 TIMES DAILY PRN
Qty: 30 CAPSULE | Refills: 0 | Status: SHIPPED | OUTPATIENT
Start: 2022-11-22 | End: 2023-02-15
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
## 2022-12-02 DIAGNOSIS — G43.719 INTRACTABLE CHRONIC MIGRAINE WITHOUT AURA AND WITHOUT STATUS MIGRAINOSUS: ICD-10-CM

## 2022-12-02 RX ORDER — BUTALBITAL, ACETAMINOPHEN AND CAFFEINE 50; 325; 40 MG/1; MG/1; MG/1
1 TABLET ORAL EVERY 4 HOURS PRN
Qty: 30 TABLET | Refills: 1 | Status: SHIPPED | OUTPATIENT
Start: 2022-12-02 | End: 2023-03-13

## 2022-12-02 NOTE — TELEPHONE ENCOUNTER
Rx Refill Note  Requested Prescriptions     Pending Prescriptions Disp Refills   • butalbital-acetaminophen-caffeine (FIORICET, ESGIC) -40 MG per tablet [Pharmacy Med Name: [id number] -40] 30 tablet 1     Sig: TAKE 1 TABLET BY MOUTH EVERY 4 (FOUR) HOURS AS NEEDED FOR HEADACHE.    Last filled 09/27/2022 with 1 refill. Pend to provider.  Last office visit with prescribing clinician: Visit date not found   Last telemedicine visit with prescribing clinician:   11/10/2022   Next office visit with prescribing clinician: Visit date not found     Kalia Keller MA  12/02/22, 09:45 EST

## 2022-12-06 ENCOUNTER — TELEPHONE (OUTPATIENT)
Dept: NEUROLOGY | Facility: CLINIC | Age: 47
End: 2022-12-06

## 2022-12-06 NOTE — TELEPHONE ENCOUNTER
Caller: LukeCarson III    Relationship: Self    Best call back number: [phone]    Requested Prescriptions:   Requested Prescriptions     Pending Prescriptions Disp Refills   • Atogepant (Qulipta) 30 MG tablet       Sig: Take 1 tablet by mouth.        Pharmacy where request should be sent:  [facility] [place] [phone]    Additional details provided by patient: PATIENT HAS 1 SAMPLE PILL LEFT THAT HE WILL TAKE IN THE MORNING & THEN WILL BE COMPLETELY OUT. PLEASE FILL RX & SENT TO PHARM ON FILE    Does the patient have less than a 3 day supply:  [x] Yes  [] No    Would you like a call back once the refill request has been completed: [x] Yes [] No    If the office needs to give you a call back, can they leave a voicemail: [x] Yes [] No    Milana Head Rep   12/06/22 14:23 EST

## 2022-12-07 RX ORDER — ATOGEPANT 30 MG/1
30 TABLET ORAL DAILY
Qty: 30 TABLET | Refills: 3 | Status: CANCELLED | OUTPATIENT
Start: 2022-12-07

## 2022-12-07 NOTE — TELEPHONE ENCOUNTER
Provider: GRAZYNA MALHOTRA MD    Caller: MACHO    Relationship to Patient: SELF    Phone Number: [phone]    Reason for Call: PT CALLING TO S/W OFFICE D/T MULTIPLE CALLS FOR THE REFILL.  CALLED S/W JOSE C AT CLINIC, TRANSFERRED TO KIM.   KIM ASKED TO WARM TRANSFER

## 2022-12-07 NOTE — TELEPHONE ENCOUNTER
S/w Carson and am leaving samples of both Qulipta which he says is wokring very WELL!  And Ubrelvy to take PRN    Notified i'm working on his PA for qulipta now.     ,  I just wanted to clarify as patient states you told him he could not take the Nurtec PRN with the Qulipta?

## 2022-12-08 NOTE — TELEPHONE ENCOUNTER
Notified Carson who states understanding to ok paulette Lepe to take the Nurtec PRN with the Qulipta.

## 2022-12-09 ENCOUNTER — TELEPHONE (OUTPATIENT)
Dept: NEUROLOGY | Facility: CLINIC | Age: 47
End: 2022-12-09

## 2022-12-09 RX ORDER — UBROGEPANT 100 MG/1
TABLET ORAL
Qty: 10 TABLET | Refills: 6 | Status: SHIPPED | OUTPATIENT
Start: 2022-12-09 | End: 2023-02-15 | Stop reason: SDUPTHER

## 2022-12-09 NOTE — TELEPHONE ENCOUNTER
Rx Refill Note  Requested Prescriptions     Pending Prescriptions Disp Refills   • ubrogepant 100 MG tablet [Pharmacy Med Name: UBRELVY 100MG TABLETS] 10 tablet 6     Sig: TAKE 1 TABLET BY MOUTH AS NEEDED FOR MIGRANE      Last filled:04/29/2022 with 6 refills. Sent  Last office visit with prescribing clinician: Visit date not found      Next office visit with prescribing clinician: 12/9/2022     Kalia Keller MA  12/09/22, 12:10 EST

## 2022-12-09 NOTE — TELEPHONE ENCOUNTER
Caller: Carson Butler III    Relationship: Self    Best call back number: [phone]    Who are you requesting to speak with (clinical staff, provider,  specific staff member):   DR MALHOTRA    What was the call regarding:   RX QUESTION    Do you require a callback: YES, PLEASE.

## 2022-12-14 ENCOUNTER — PRIOR AUTHORIZATION (OUTPATIENT)
Dept: NEUROLOGY | Facility: CLINIC | Age: 47
End: 2022-12-14

## 2022-12-14 RX ORDER — ATOGEPANT 60 MG/1
60 TABLET ORAL DAILY
Qty: 30 TABLET | Refills: 2 | Status: SHIPPED | OUTPATIENT
Start: 2022-12-14 | End: 2023-03-22 | Stop reason: SDUPTHER

## 2022-12-14 NOTE — TELEPHONE ENCOUNTER
PRIOR AUTH  Key: [id number] - PA Case ID: [id number] - Rx #: [id number] help? Call us at [phone]  Status

## 2022-12-14 NOTE — TELEPHONE ENCOUNTER
S/w Carson. His Nurtec and Ubrelvy are APPROVED however have not heard back on Qulipta.     Needed to send Script in and Completing PA and get back with them.

## 2023-01-18 ENCOUNTER — TELEPHONE (OUTPATIENT)
Dept: NEUROLOGY | Facility: CLINIC | Age: 48
End: 2023-01-18
Payer: COMMERCIAL

## 2023-01-18 NOTE — TELEPHONE ENCOUNTER
Shavonne with [facility] called stating need to schedule his Botox delivery.    As he was not on for his next Botox appt I calculated it would be due around 2/10/2023 as his last was in November plus I could find a slot there so Shavonne will ship Botox and will arrive on 1/31/23 and I called Carson and got his appt scheduled on the 10th @ 2:30pm with [facility].    He states appreciation.

## 2023-02-15 ENCOUNTER — PROCEDURE VISIT (OUTPATIENT)
Dept: NEUROLOGY | Facility: CLINIC | Age: 48
End: 2023-02-15
Payer: COMMERCIAL

## 2023-02-15 DIAGNOSIS — G43.011 INTRACTABLE MIGRAINE WITHOUT AURA AND WITH STATUS MIGRAINOSUS: Primary | ICD-10-CM

## 2023-02-15 PROCEDURE — 64615 CHEMODENERV MUSC MIGRAINE: CPT | Performed by: PSYCHIATRY & NEUROLOGY

## 2023-02-15 NOTE — PROGRESS NOTES
Botox Procedure Note    Current headache frequency: _6-8_ headaches days / month.    The risks and benefits were discussed with the patient. The patient was given the opportunity to ask questions. Informed consent form was signed.    Onabotulinumtoxin A was reconstituted with 0.9% normal saline. All injections sites were prepped with alcohol swab. Approximately 5 units of botox were injected into each of the following sites bilaterally as per routine migraine botox injection PREEMPT protocol: , procerus, frontalis, temporalis, occipitalis, upper cervical paraspinals, and trapezius.    The patient tolerated the procedure well. There were no immediate complications. The patient will follow up in approximately 12 weeks for her next injection.    Total amount of onabotulinumtoxin A injected was 155 units with 45 units wasted.      Note: He reports that he has been taking Qulipta since his last visit and has had excellent response with migraine frequency reducing to 5 migraines in last 3 months.  He is tolerating Qulipta very well.  He is using Ubrelvy as needed as an abortive treatment is working well.  I will be resending his Ubrelvy prescription to [facility].  Otherwise, I will see him back in clinic in 12 weeks for next injection.

## 2023-02-17 DIAGNOSIS — J01.00 SUBACUTE MAXILLARY SINUSITIS: ICD-10-CM

## 2023-02-17 RX ORDER — DM/PSEUDOEPHED/ACETAMINOPHEN 15-30-500
TABLET ORAL
Qty: 90 EACH | Refills: 1 | Status: SHIPPED | OUTPATIENT
Start: 2023-02-17

## 2023-03-12 DIAGNOSIS — F51.01 PRIMARY INSOMNIA: ICD-10-CM

## 2023-03-12 DIAGNOSIS — G43.719 INTRACTABLE CHRONIC MIGRAINE WITHOUT AURA AND WITHOUT STATUS MIGRAINOSUS: ICD-10-CM

## 2023-03-13 RX ORDER — TRAZODONE HYDROCHLORIDE 100 MG/1
100 TABLET ORAL
Qty: 90 TABLET | Refills: 1 | Status: SHIPPED | OUTPATIENT
Start: 2023-03-13

## 2023-03-13 RX ORDER — BUTALBITAL, ACETAMINOPHEN AND CAFFEINE 50; 325; 40 MG/1; MG/1; MG/1
TABLET ORAL
Qty: 30 TABLET | Refills: 1 | Status: SHIPPED | OUTPATIENT
Start: 2023-03-13

## 2023-03-13 NOTE — TELEPHONE ENCOUNTER
Rx Refill Note  Requested Prescriptions     Pending Prescriptions Disp Refills   • butalbital-acetaminophen-caffeine (FIORICET, ESGIC) -40 MG per tablet [Pharmacy Med Name: [id number] -40] 30 tablet 1     Sig: TAKE 1 TABLET BY MOUTH EVERY 4 HOURS AS NEEDED FOR HEADACHE.      Last filled:12/02/22 with 1 refill. Pending to provider  Last office visit with prescribing clinician: Visit date not found      Next office visit with prescribing clinician: 5/10/2023     Kalia Keller MA  03/13/23, 09:00 EDT

## 2023-03-22 ENCOUNTER — TELEPHONE (OUTPATIENT)
Dept: NEUROLOGY | Facility: CLINIC | Age: 48
End: 2023-03-22
Payer: COMMERCIAL

## 2023-03-22 RX ORDER — ATOGEPANT 60 MG/1
60 TABLET ORAL DAILY
Qty: 30 TABLET | Refills: 2 | Status: SHIPPED | OUTPATIENT
Start: 2023-03-22

## 2023-03-22 NOTE — TELEPHONE ENCOUNTER
Provider: GRAZYNA MALHOTRA MD    Caller: MACHO    Relationship to Patient: SELF    Phone Number: [phone]    Reason for Call: PT IS REQUESTING SAMPLES OF THE QULIPTA.   [facility] IS OUT OF THE MEDICATION RIGHT NOW.  PT IS OUT OF THE MEDICATION.      PLEASE CALL & ADVISE

## 2023-03-22 NOTE — TELEPHONE ENCOUNTER
Patient stopped by office and received Qulipta samples. Sent updated script to different pharmacy as patients original pharmacy ran out of this medication.

## 2023-04-18 RX ORDER — ATOGEPANT 60 MG/1
TABLET ORAL
Qty: 30 TABLET | Refills: 2 | Status: SHIPPED | OUTPATIENT
Start: 2023-04-18

## 2023-04-18 NOTE — TELEPHONE ENCOUNTER
Rx Refill Note  Requested Prescriptions     Pending Prescriptions Disp Refills   • Qulipta 60 MG tablet [Pharmacy Med Name: QULIPTA 60 MG TABLET] 30 tablet 2     Sig: TAKE 1 TABLET BY MOUTH EVERY DAY      Last filled:Patient requesting updated pharmacy. Sent  Last office visit with prescribing clinician: Visit date not found      Next office visit with prescribing clinician: 5/10/2023     Kalia Keller MA  04/18/23, 12:43 EDT

## 2023-04-24 DIAGNOSIS — G43.719 INTRACTABLE CHRONIC MIGRAINE WITHOUT AURA AND WITHOUT STATUS MIGRAINOSUS: ICD-10-CM

## 2023-04-24 RX ORDER — BUTALBITAL, ACETAMINOPHEN AND CAFFEINE 50; 325; 40 MG/1; MG/1; MG/1
TABLET ORAL
Qty: 30 TABLET | Refills: 1 | Status: SHIPPED | OUTPATIENT
Start: 2023-04-24

## 2023-04-24 NOTE — TELEPHONE ENCOUNTER
Rx Refill Note  Requested Prescriptions     Pending Prescriptions Disp Refills   • butalbital-acetaminophen-caffeine (FIORICET, ESGIC) -40 MG per tablet [Pharmacy Med Name: BUT/ACETAMINOPHEN/CAFF -40 TB] 30 tablet      Sig: TAKE 1 TABLET BY MOUTH EVERY 4 HOURS AS NEEDED FOR HEADACHE      Last filled: 3/13/23 with 1 refill pending to [facility]  Last office visit with prescribing clinician: 2/15/23    Next office visit with prescribing clinician: 5/10/2023     Octavia Leong MA  04/24/23, 13:29 EDT

## 2023-05-04 ENCOUNTER — TELEPHONE (OUTPATIENT)
Dept: NEUROLOGY | Facility: CLINIC | Age: 48
End: 2023-05-04
Payer: COMMERCIAL

## 2023-05-04 NOTE — TELEPHONE ENCOUNTER
I called, lm, and text patient to call [facility] at [phone] to have his Botox delivered.

## 2023-05-05 NOTE — TELEPHONE ENCOUNTER
Text patient again to call [facility] at [phone].  They have tried to reach him.  I also asked for a response so I know he is receiving the text.

## 2023-05-08 NOTE — TELEPHONE ENCOUNTER
MARKOS REGARDING HIS BOTOX ON 5.10.23, INFORMED HIM WE WILL CANCEL HIS APPOINTMENT IF WE DON'T HERE FROM HIM BY TOMORROW. HE IS TO CONTACT [facility] NOT RECEIVE HIS BOTOX BY Wednesday.     HUB TO READ

## 2023-05-09 ENCOUNTER — TELEPHONE (OUTPATIENT)
Dept: NEUROLOGY | Facility: CLINIC | Age: 48
End: 2023-05-09
Payer: COMMERCIAL

## 2023-05-09 NOTE — TELEPHONE ENCOUNTER
PT CALLED UPSET RECEIVED MESS INS PROBLEM , STATES HAS SAME INS AND WAS VERY VERBAL. CALLED OFFICE TOLD SAME INS LOOKS LIKE VERIFYING, CANELO TOOK CALL AS THIS IS FOR BOTOX.

## 2023-05-09 NOTE — TELEPHONE ENCOUNTER
Patient called upset about his appointment being cancelled. I informed [facility] that [facility] because they are stating that the patient had told them he had new insurance. He was saying that he doesn't have new insurance.

## 2023-05-12 ENCOUNTER — PATIENT MESSAGE (OUTPATIENT)
Dept: INTERNAL MEDICINE | Facility: CLINIC | Age: 48
End: 2023-05-12
Payer: COMMERCIAL

## 2023-05-12 ENCOUNTER — TELEPHONE (OUTPATIENT)
Dept: INTERNAL MEDICINE | Facility: CLINIC | Age: 48
End: 2023-05-12
Payer: COMMERCIAL

## 2023-05-12 NOTE — TELEPHONE ENCOUNTER
Please schedule him at 2.30 pm this Monday the 15th for his physical.I have sent him a message via [employer].    thanks

## 2023-05-12 NOTE — TELEPHONE ENCOUNTER
----- Message from Geri Rubin MA sent at 5/12/2023  8:25 AM EDT -----  Regarding: FW: Appointment  Contact: [phone]  Pt had to cancel his physical yesterday, would you be able to fit in today or Monday? Scheduled for next week but he needs done by Monday.  ----- Message -----  From: Carson Butler III  Sent: 5/12/2023   8:25 AM EDT  To: [facility]  Subject: Appointment                                      I called already. That appt needs to be cancelled. Have to have done today or Monday, it is for work. Didn't realize that. I would have come Wednesday with no sleep

## 2023-05-14 NOTE — TELEPHONE ENCOUNTER
Please check with patient if he is still needing the physical?  His [employer] message states that he got it done at [facility].    If he does need, can use the 2.30 spot.  thanks

## 2023-05-15 NOTE — TELEPHONE ENCOUNTER
HUB TO READ     Left message with patient confirming he no longer needs to see Dr Goodson for a physical? She did receive [employer] message but she is just confirming. Office number given.

## 2023-08-04 ENCOUNTER — SPECIALTY PHARMACY (OUTPATIENT)
Dept: NEUROLOGY | Facility: CLINIC | Age: 48
End: 2023-08-04
Payer: COMMERCIAL

## 2023-08-09 ENCOUNTER — OFFICE VISIT (OUTPATIENT)
Dept: INTERNAL MEDICINE | Facility: CLINIC | Age: 48
End: 2023-08-09
Payer: COMMERCIAL

## 2023-08-09 VITALS
HEIGHT: 71 IN | BODY MASS INDEX: 18.9 KG/M2 | OXYGEN SATURATION: 99 % | HEART RATE: 92 BPM | WEIGHT: 135 LBS | SYSTOLIC BLOOD PRESSURE: 124 MMHG | DIASTOLIC BLOOD PRESSURE: 72 MMHG | TEMPERATURE: 97.7 F

## 2023-08-09 DIAGNOSIS — R21 RASH AND NONSPECIFIC SKIN ERUPTION: Primary | ICD-10-CM

## 2023-08-09 DIAGNOSIS — Z23 NEED FOR VACCINATION: ICD-10-CM

## 2023-08-09 NOTE — PROGRESS NOTES
Immunization  Immunization performed in left deltoid by Melanie Houser MA. Patient tolerated the procedure well without complications.  08/09/23   Melanie oHuser MA

## 2023-09-18 RX ORDER — UBROGEPANT 100 MG/1
TABLET ORAL
Qty: 10 TABLET | Refills: 6 | Status: SHIPPED | OUTPATIENT
Start: 2023-09-18

## 2023-09-18 RX ORDER — ATOGEPANT 30 MG/1
30 TABLET ORAL DAILY
Qty: 30 TABLET | Refills: 2 | Status: SHIPPED | OUTPATIENT
Start: 2023-09-18

## 2023-10-16 DIAGNOSIS — G43.719 INTRACTABLE CHRONIC MIGRAINE WITHOUT AURA AND WITHOUT STATUS MIGRAINOSUS: ICD-10-CM

## 2023-10-17 RX ORDER — BUTALBITAL, ACETAMINOPHEN AND CAFFEINE 50; 325; 40 MG/1; MG/1; MG/1
1 TABLET ORAL EVERY 4 HOURS PRN
Qty: 30 TABLET | Refills: 0 | Status: SHIPPED | OUTPATIENT
Start: 2023-10-17

## 2023-10-17 NOTE — TELEPHONE ENCOUNTER
Rx Refill Note  Requested Prescriptions     Pending Prescriptions Disp Refills    butalbital-acetaminophen-caffeine (FIORICET, ESGIC) -40 MG per tablet [Pharmacy Med Name: BUT/ACETAMINOPHEN/CAFF -40 TB] 30 tablet      Sig: TAKE 1 TABLET BY MOUTH EVERY 4 HOURS AS NEEDED FOR HEADACHE      Last filled: 7/17/23 #30 with 0 refills    Last office visit with prescribing clinician: 2/15/23 procedure visit  Next office visit with prescribing clinician: Visit date not found  --> added note to sig pt needs appt for further refills     Carlos Valencia MA  10/17/23, 08:51 EDT

## 2023-10-29 DIAGNOSIS — F51.01 PRIMARY INSOMNIA: ICD-10-CM

## 2023-10-30 RX ORDER — TRAZODONE HYDROCHLORIDE 100 MG/1
100 TABLET ORAL
Qty: 30 TABLET | Refills: 0 | Status: SHIPPED | OUTPATIENT
Start: 2023-10-30

## 2023-11-15 DIAGNOSIS — G43.719 INTRACTABLE CHRONIC MIGRAINE WITHOUT AURA AND WITHOUT STATUS MIGRAINOSUS: ICD-10-CM

## 2023-11-15 RX ORDER — BUTALBITAL, ACETAMINOPHEN AND CAFFEINE 50; 325; 40 MG/1; MG/1; MG/1
1 TABLET ORAL EVERY 4 HOURS PRN
Qty: 30 TABLET | Refills: 0 | Status: SHIPPED | OUTPATIENT
Start: 2023-11-15

## 2023-11-15 NOTE — TELEPHONE ENCOUNTER
Rx Refill Note  Requested Prescriptions     Pending Prescriptions Disp Refills    butalbital-acetaminophen-caffeine (FIORICET, ESGIC) -40 MG per tablet [Pharmacy Med Name: BUT/ACETAMINOPHEN/CAFF -40 TB] 30 tablet      Sig: TAKE 1 TABLET BY MOUTH EVERY 4 HOURS AS NEEDED FOR HEADACHE      Last filled: 10/17/23 #30 with 0 refills    Last office visit with prescribing clinician: 2/15/2023 procedure visit  Next office visit with prescribing clinician: Visit date not found  --> added note to sig pt needs appt for further refills      Carlos Valencia MA  11/15/23, 14:04 EST

## 2023-11-25 DIAGNOSIS — F51.01 PRIMARY INSOMNIA: ICD-10-CM

## 2023-11-25 RX ORDER — TRAZODONE HYDROCHLORIDE 100 MG/1
100 TABLET ORAL
Qty: 30 TABLET | Refills: 0 | OUTPATIENT
Start: 2023-11-25

## 2023-11-26 DIAGNOSIS — F51.01 PRIMARY INSOMNIA: ICD-10-CM

## 2023-11-26 RX ORDER — TRAZODONE HYDROCHLORIDE 100 MG/1
100 TABLET ORAL
Qty: 30 TABLET | Refills: 0 | OUTPATIENT
Start: 2023-11-26

## 2023-12-18 DIAGNOSIS — G43.719 INTRACTABLE CHRONIC MIGRAINE WITHOUT AURA AND WITHOUT STATUS MIGRAINOSUS: ICD-10-CM

## 2023-12-18 RX ORDER — BUTALBITAL, ACETAMINOPHEN AND CAFFEINE 50; 325; 40 MG/1; MG/1; MG/1
1 TABLET ORAL EVERY 4 HOURS PRN
Qty: 30 TABLET | Refills: 0 | Status: SHIPPED | OUTPATIENT
Start: 2023-12-18

## 2023-12-18 NOTE — TELEPHONE ENCOUNTER
Rx Refill Note  Requested Prescriptions     Pending Prescriptions Disp Refills    butalbital-acetaminophen-caffeine (FIORICET, ESGIC) -40 MG per tablet 30 tablet 0     Sig: Take 1 tablet by mouth Every 4 (Four) Hours As Needed for Headache. **please schedule follow up for further refills**      Last filled:11/15/2023 with 0 refills  Last office visit with prescribing clinician: Visit date not found      Next office visit with prescribing clinician: 5/20/2024     MECHELLE MEREDITH  12/18/23, 16:28 EST    Pended to pharmacy and provider.

## 2023-12-18 NOTE — TELEPHONE ENCOUNTER
\URGENT    Medication requested (name and dose):      UBRELVY 100 MG TABLET  TAKE 1 TABLET BY MOUTH AS NEEDED FOR MIGRAINE    NURTEC 75 MT DISPERSIBLE TABLET  DISSOLVE 1 TABLET ON THE TONGUE EVERY OTHER DAY    ATOGEPANT 30 MG TABLET  TAKE 1 TABLET BY MOUTH DAILY    BUTALBITAL-ACETAMINOPHEN-CAFFEINE -40 MG PER TABLET  TAKE 1 TABLET BY MOUTH EVERY 4 HOURS AS NEEDED FOR HEADACHE      Pharm[facility] where [facility] should be sent:      [id number] [address] - [phone] [facility] [phone]      Additional details provided by patient:  PATIENT IS OUT OF ALL MEDICATIONS    Best call back number:  [phone]    Does the patient have less than a 3 day supply:  [x] Yes  [] No    Milana Bowden Rep  12/18/23, 15:05 EST

## 2023-12-18 NOTE — TELEPHONE ENCOUNTER
I have approved 1 fill for now, however, patient has not been seen in quite some time. He must be seen 5/20/2024 for more refills. I have approved 1 fill, should use sparingly, until follow up. This is in Dr. Lepe's absence and he will be back in clinic in January. Thanks, TEE White

## 2023-12-19 RX ORDER — ATOGEPANT 30 MG/1
30 TABLET ORAL DAILY
Qty: 90 TABLET | Refills: 1 | Status: SHIPPED | OUTPATIENT
Start: 2023-12-19 | End: 2024-12-18

## 2023-12-19 RX ORDER — RIMEGEPANT SULFATE 75 MG/75MG
75 TABLET, ORALLY DISINTEGRATING ORAL AS NEEDED
Qty: 16 TABLET | Refills: 6 | Status: SHIPPED | OUTPATIENT
Start: 2023-12-19

## 2023-12-19 NOTE — TELEPHONE ENCOUNTER
Rx Refill Note  Requested Prescriptions     Pending Prescriptions Disp Refills    ubrogepant (Ubrelvy) 100 MG tablet 10 tablet 6     Sig: Take 1 tablet by mouth As Needed.    Nurtec 75 MG dispersible tablet 16 tablet 6    Atogepant (Qulipta) 30 MG tablet 90 tablet 3     Sig: Take 1 tablet by mouth Daily.     Signed Prescriptions Disp Refills    butalbital-acetaminophen-caffeine (FIORICET, ESGIC) -40 MG per tablet 30 tablet 0     Sig: Take 1 tablet by mouth Every 4 (Four) Hours As Needed for Headache.     Authorizing Provider: DAYNA FISCHER      Last filled:  Last office visit with prescribing clinician: Visit date not found      Next office visit with prescribing clinician: 5/20/2024     MECHELLE MEREDITH  12/19/23, 07:51 EST    Pending to our specialty pharmacy for approval

## 2024-01-26 ENCOUNTER — APPOINTMENT (OUTPATIENT)
Dept: CT IMAGING | Age: 49
End: 2024-01-26

## 2024-01-26 ENCOUNTER — HOSPITAL ENCOUNTER (EMERGENCY)
Age: 49
Discharge: HOME | End: 2024-01-26
Attending: EMERGENCY MEDICINE

## 2024-01-26 VITALS
RESPIRATION RATE: 16 BRPM | DIASTOLIC BLOOD PRESSURE: 88 MMHG | TEMPERATURE: 98.1 F | HEART RATE: 105 BPM | OXYGEN SATURATION: 100 % | SYSTOLIC BLOOD PRESSURE: 152 MMHG

## 2024-01-26 DIAGNOSIS — S30.21XA CONTUSION OF PENIS, INITIAL ENCOUNTER: Primary | ICD-10-CM

## 2024-01-26 LAB
ALBUMIN SERPL BCG-MCNC: 4.1 G/DL (ref 3.5–5.1)
ALP SERPL-CCNC: 63 U/L (ref 38–126)
ALT SERPL W/O P-5'-P-CCNC: 27 U/L (ref 11–66)
ANION GAP SERPL CALC-SCNC: 13 MEQ/L (ref 8–16)
APTT PPP: 30.4 SECONDS (ref 22–38)
AST SERPL-CCNC: 19 U/L (ref 5–40)
BACTERIA URNS QL MICRO: ABNORMAL /HPF
BASOPHILS ABSOLUTE: 0.1 THOU/MM3 (ref 0–0.1)
BASOPHILS NFR BLD AUTO: 1.4 %
BILIRUB SERPL-MCNC: < 0.2 MG/DL (ref 0.3–1.2)
BILIRUB UR QL STRIP.AUTO: NEGATIVE
BUN SERPL-MCNC: 18 MG/DL (ref 7–22)
CALCIUM SERPL-MCNC: 9.3 MG/DL (ref 8.5–10.5)
CASTS #/AREA URNS LPF: ABNORMAL /LPF
CASTS 2: ABNORMAL /LPF
CHARACTER UR: CLEAR
CHLORIDE SERPL-SCNC: 102 MEQ/L (ref 98–111)
CK SERPL-CCNC: 35 U/L (ref 55–170)
CO2 SERPL-SCNC: 27 MEQ/L (ref 23–33)
COLOR: YELLOW
CREAT SERPL-MCNC: 0.7 MG/DL (ref 0.4–1.2)
CRYSTALS URNS MICRO: ABNORMAL
DEPRECATED RDW RBC AUTO: 44.7 FL (ref 35–45)
EOSINOPHIL NFR BLD AUTO: 5.9 %
EOSINOPHILS ABSOLUTE: 0.6 THOU/MM3 (ref 0–0.4)
EPITHELIAL CELLS, UA: ABNORMAL /HPF
ERYTHROCYTE [DISTWIDTH] IN BLOOD BY AUTOMATED COUNT: 13.1 % (ref 11.5–14.5)
GFR SERPL CREATININE-BSD FRML MDRD: > 60 ML/MIN/1.73M2
GLUCOSE SERPL-MCNC: 160 MG/DL (ref 70–108)
GLUCOSE UR QL STRIP.AUTO: 100 MG/DL
HCT VFR BLD AUTO: 41.6 % (ref 42–52)
HGB BLD-MCNC: 13.7 GM/DL (ref 14–18)
HGB UR QL STRIP.AUTO: ABNORMAL
IMM GRANULOCYTES # BLD AUTO: 0.04 THOU/MM3 (ref 0–0.07)
IMM GRANULOCYTES NFR BLD AUTO: 0.4 %
INR PPP: 0.91 (ref 0.85–1.13)
KETONES UR QL STRIP.AUTO: NEGATIVE
LIPASE SERPL-CCNC: 16.1 U/L (ref 5.6–51.3)
LYMPHOCYTES ABSOLUTE: 2.7 THOU/MM3 (ref 1–4.8)
LYMPHOCYTES NFR BLD AUTO: 27.1 %
MCH RBC QN AUTO: 30.5 PG (ref 26–33)
MCHC RBC AUTO-ENTMCNC: 32.9 GM/DL (ref 32.2–35.5)
MCV RBC AUTO: 92.7 FL (ref 80–94)
MISCELLANEOUS 2: ABNORMAL
MONOCYTES ABSOLUTE: 0.8 THOU/MM3 (ref 0.4–1.3)
MONOCYTES NFR BLD AUTO: 7.7 %
NEUTROPHILS NFR BLD AUTO: 57.5 %
NITRITE UR QL STRIP: NEGATIVE
NRBC BLD AUTO-RTO: 0 /100 WBC
OSMOLALITY SERPL CALC.SUM OF ELEC: 288.4 MOSMOL/KG (ref 275–300)
PH UR STRIP.AUTO: 6 [PH] (ref 5–9)
PLATELET # BLD AUTO: 463 THOU/MM3 (ref 130–400)
PMV BLD AUTO: 9 FL (ref 9.4–12.4)
POTASSIUM SERPL-SCNC: 3.9 MEQ/L (ref 3.5–5.2)
PROT SERPL-MCNC: 6.7 G/DL (ref 6.1–8)
PROT UR STRIP.AUTO-MCNC: NEGATIVE MG/DL
RBC # BLD AUTO: 4.49 MILL/MM3 (ref 4.7–6.1)
RBC URINE: ABNORMAL /HPF
RENAL EPI CELLS #/AREA URNS HPF: ABNORMAL /[HPF]
SEGMENTED NEUTROPHILS ABSOLUTE COUNT: 5.8 THOU/MM3 (ref 1.8–7.7)
SODIUM SERPL-SCNC: 142 MEQ/L (ref 135–145)
SP GR UR REFRACT.AUTO: 1.01 (ref 1–1.03)
UROBILINOGEN, URINE: 0.2 EU/DL (ref 0–1)
WBC # BLD AUTO: 10 THOU/MM3 (ref 4.8–10.8)
WBC #/AREA URNS HPF: ABNORMAL /HPF
WBC #/AREA URNS HPF: NEGATIVE /[HPF]
YEAST LIKE FUNGI URNS QL MICRO: ABNORMAL

## 2024-01-26 PROCEDURE — 85730 THROMBOPLASTIN TIME PARTIAL: CPT

## 2024-01-26 PROCEDURE — 82550 ASSAY OF CK (CPK): CPT

## 2024-01-26 PROCEDURE — 81001 URINALYSIS AUTO W/SCOPE: CPT

## 2024-01-26 PROCEDURE — 6360000004 HC RX CONTRAST MEDICATION: Performed by: STUDENT IN AN ORGANIZED HEALTH CARE EDUCATION/TRAINING PROGRAM

## 2024-01-26 PROCEDURE — 87491 CHLMYD TRACH DNA AMP PROBE: CPT

## 2024-01-26 PROCEDURE — 87591 N.GONORRHOEAE DNA AMP PROB: CPT

## 2024-01-26 PROCEDURE — 99285 EMERGENCY DEPT VISIT HI MDM: CPT

## 2024-01-26 PROCEDURE — 83690 ASSAY OF LIPASE: CPT

## 2024-01-26 PROCEDURE — 80053 COMPREHEN METABOLIC PANEL: CPT

## 2024-01-26 PROCEDURE — 36415 COLL VENOUS BLD VENIPUNCTURE: CPT

## 2024-01-26 PROCEDURE — 85610 PROTHROMBIN TIME: CPT

## 2024-01-26 PROCEDURE — 74177 CT ABD & PELVIS W/CONTRAST: CPT

## 2024-01-26 PROCEDURE — 85025 COMPLETE CBC W/AUTO DIFF WBC: CPT

## 2024-01-26 PROCEDURE — 6370000000 HC RX 637 (ALT 250 FOR IP): Performed by: STUDENT IN AN ORGANIZED HEALTH CARE EDUCATION/TRAINING PROGRAM

## 2024-01-26 RX ORDER — ACETAMINOPHEN 500 MG
1000 TABLET ORAL ONCE
Status: COMPLETED | OUTPATIENT
Start: 2024-01-26 | End: 2024-01-26

## 2024-01-26 RX ADMIN — IOPAMIDOL 80 ML: 755 INJECTION, SOLUTION INTRAVENOUS at 19:02

## 2024-01-26 RX ADMIN — ACETAMINOPHEN 1000 MG: 500 TABLET ORAL at 18:17

## 2024-01-26 ASSESSMENT — PAIN - FUNCTIONAL ASSESSMENT: PAIN_FUNCTIONAL_ASSESSMENT: 0-10

## 2024-01-26 ASSESSMENT — PAIN DESCRIPTION - LOCATION: LOCATION: SCROTUM;PENIS

## 2024-01-26 ASSESSMENT — PAIN SCALES - GENERAL: PAINLEVEL_OUTOF10: 6
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
## 2024-01-27 LAB
CHLAMYDIA TRACHOMATIS BY RT-PCR: NOT DETECTED
CT/NG SOURCE: NORMAL
NEISSERIA GONORRHOEAE BY RT-PCR: NOT DETECTED

## 2024-01-27 NOTE — ED NOTES
Patient resting in bed. Respirations easy and unlabored. No distress noted. Call light within reach.    Pt to BR of UA
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
## 2024-01-29 ENCOUNTER — OFFICE VISIT (OUTPATIENT)
Dept: UROLOGY | Age: 49
End: 2024-01-29

## 2024-01-29 VITALS — BODY MASS INDEX: 22.4 KG/M2 | RESPIRATION RATE: 14 BRPM | WEIGHT: 160 LBS | HEIGHT: 71 IN

## 2024-01-29 DIAGNOSIS — R10.31 RIGHT INGUINAL PAIN: Primary | ICD-10-CM

## 2024-01-29 DIAGNOSIS — S30.21XD: ICD-10-CM

## 2024-01-29 PROCEDURE — 99203 OFFICE O/P NEW LOW 30 MIN: CPT

## 2024-01-29 RX ORDER — MINOCYCLINE HYDROCHLORIDE 50 MG/1
2 CAPSULE ORAL DAILY
COMMUNITY

## 2024-01-29 RX ORDER — HYDROXYZINE HYDROCHLORIDE 10 MG/1
10 TABLET, FILM COATED ORAL 3 TIMES DAILY PRN
COMMUNITY

## 2024-01-29 RX ORDER — AMLODIPINE BESYLATE 5 MG/1
5 TABLET ORAL DAILY
COMMUNITY

## 2024-01-29 RX ORDER — ELETRIPTAN HYDROBROMIDE 40 MG/1
1 TABLET, FILM COATED ORAL PRN
COMMUNITY
Start: 2021-09-20

## 2024-01-29 RX ORDER — TRAZODONE HYDROCHLORIDE 100 MG/1
100 TABLET ORAL NIGHTLY
COMMUNITY

## 2024-01-29 RX ORDER — PROPRANOLOL HYDROCHLORIDE 10 MG/1
1 TABLET ORAL EVERY MORNING
COMMUNITY
Start: 2021-06-30

## 2024-01-29 RX ORDER — BUTALBITAL, ACETAMINOPHEN AND CAFFEINE 50; 325; 40 MG/1; MG/1; MG/1
TABLET ORAL
COMMUNITY
Start: 2023-12-18

## 2024-01-29 RX ORDER — GABAPENTIN 300 MG/1
CAPSULE ORAL
COMMUNITY

## 2024-01-29 RX ORDER — MULTIPLE VITAMINS W/ MINERALS TAB 9MG-400MCG
1 TAB ORAL DAILY
COMMUNITY

## 2024-01-29 RX ORDER — ERENUMAB-AOOE 140 MG/ML
INJECTION, SOLUTION SUBCUTANEOUS
COMMUNITY

## 2024-01-29 RX ORDER — ATOGEPANT 30 MG/1
30 TABLET ORAL DAILY
COMMUNITY
Start: 2023-09-18 | End: 2024-12-18

## 2024-01-29 RX ORDER — RIMEGEPANT SULFATE 75 MG/75MG
TABLET, ORALLY DISINTEGRATING ORAL
COMMUNITY
Start: 2023-12-19

## 2024-01-29 RX ORDER — METHYLPHENIDATE HYDROCHLORIDE 20 MG/1
1 TABLET ORAL 2 TIMES DAILY
COMMUNITY
Start: 2022-11-04

## 2024-01-29 RX ORDER — UBROGEPANT 100 MG/1
1 TABLET ORAL PRN
COMMUNITY
Start: 2023-12-19
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
## 2024-02-05 ENCOUNTER — TELEPHONE (OUTPATIENT)
Dept: NEUROLOGY | Facility: CLINIC | Age: 49
End: 2024-02-05
Payer: COMMERCIAL

## 2024-02-05 NOTE — TELEPHONE ENCOUNTER
Provider: DR MALHOTRA    Caller: PATIENT    Relationship to Patient: SELF    Phone Number: [phone]  FOR MEDICAL OR TO REACH PATIENT,  AND LEAVE VM FOR  MR. WHITLEY.    Reason for Call: PATIENT IS IN [facility]. DOES NOT HAVE ACCESS TO ANY MIGRAINE MEDICATION. ONLY HAS OTC MEDS. PATIENT IS GETTING HEADACHES EVERYDAY. WOULD LIKE TO SEE IF WE CAN CALL THE MEDICAL TEAM TO GET SOMETHING SET UP. #[phone]  (MEDICAL). PLEASE REVIEW AND ADVISE, THANK YOU.

## 2024-02-06 RX ORDER — RIMEGEPANT SULFATE 75 MG/75MG
75 TABLET, ORALLY DISINTEGRATING ORAL AS NEEDED
Qty: 16 TABLET | Refills: 6 | Status: SHIPPED | OUTPATIENT
Start: 2024-02-06

## 2024-02-06 RX ORDER — ATOGEPANT 30 MG/1
30 TABLET ORAL DAILY
Qty: 90 TABLET | Refills: 1 | Status: SHIPPED | OUTPATIENT
Start: 2024-02-06 | End: 2025-02-05

## 2024-02-06 NOTE — TELEPHONE ENCOUNTER
Outgoing calls to both nurse station and Mr. Brandon per pt request. They requested we send paper prescriptions to fax # [phone] and their providers will determine if pt needs. Gave to Dr. Lepe for approval.       Carlos DOS SANTOS

## 2024-04-08 ENCOUNTER — TELEPHONE (OUTPATIENT)
Dept: NEUROLOGY | Facility: CLINIC | Age: 49
End: 2024-04-08
Payer: COMMERCIAL

## 2024-04-11 NOTE — TELEPHONE ENCOUNTER
Carson came in office and we discussed insurance coverage with pharmacy staff. He has recently lost insurance. The best option in regard to continuing Qulipta and Nurtec and/or Ubrelvy is to apply for Medicaid, which he will likely qualify for, and we can go from there. He provided an insurance card but it was found to just be a discount card. He will call either myself or the pharmacy staff back when he has contacted the Medicaid office.     RAYA Gonzalez